# Patient Record
Sex: FEMALE | Race: WHITE | NOT HISPANIC OR LATINO
[De-identification: names, ages, dates, MRNs, and addresses within clinical notes are randomized per-mention and may not be internally consistent; named-entity substitution may affect disease eponyms.]

---

## 2017-08-14 ENCOUNTER — TRANSCRIPTION ENCOUNTER (OUTPATIENT)
Age: 52
End: 2017-08-14

## 2018-03-05 ENCOUNTER — TRANSCRIPTION ENCOUNTER (OUTPATIENT)
Age: 53
End: 2018-03-05

## 2019-03-21 ENCOUNTER — TRANSCRIPTION ENCOUNTER (OUTPATIENT)
Age: 54
End: 2019-03-21

## 2020-11-28 ENCOUNTER — INPATIENT (INPATIENT)
Facility: HOSPITAL | Age: 55
LOS: 1 days | Discharge: HOME | End: 2020-11-30
Attending: NUCLEAR MEDICINE | Admitting: NUCLEAR MEDICINE
Payer: MEDICAID

## 2020-11-28 VITALS
TEMPERATURE: 94 F | SYSTOLIC BLOOD PRESSURE: 190 MMHG | HEIGHT: 61 IN | OXYGEN SATURATION: 100 % | RESPIRATION RATE: 18 BRPM | DIASTOLIC BLOOD PRESSURE: 88 MMHG | HEART RATE: 47 BPM | WEIGHT: 100.09 LBS

## 2020-11-28 LAB
A1C WITH ESTIMATED AVERAGE GLUCOSE RESULT: 5.8 % — HIGH (ref 4–5.6)
ALBUMIN SERPL ELPH-MCNC: 4.3 G/DL — SIGNIFICANT CHANGE UP (ref 3.5–5.2)
ALP SERPL-CCNC: 102 U/L — SIGNIFICANT CHANGE UP (ref 30–115)
ALT FLD-CCNC: 20 U/L — SIGNIFICANT CHANGE UP (ref 0–41)
ANION GAP SERPL CALC-SCNC: 10 MMOL/L — SIGNIFICANT CHANGE UP (ref 7–14)
APTT BLD: 31.2 SEC — SIGNIFICANT CHANGE UP (ref 27–39.2)
AST SERPL-CCNC: 50 U/L — HIGH (ref 0–41)
BASOPHILS # BLD AUTO: 0.04 K/UL — SIGNIFICANT CHANGE UP (ref 0–0.2)
BASOPHILS NFR BLD AUTO: 0.3 % — SIGNIFICANT CHANGE UP (ref 0–1)
BILIRUB SERPL-MCNC: 0.3 MG/DL — SIGNIFICANT CHANGE UP (ref 0.2–1.2)
BLD GP AB SCN SERPL QL: SIGNIFICANT CHANGE UP
BUN SERPL-MCNC: 13 MG/DL — SIGNIFICANT CHANGE UP (ref 10–20)
CALCIUM SERPL-MCNC: 10.3 MG/DL — HIGH (ref 8.5–10.1)
CHLORIDE SERPL-SCNC: 96 MMOL/L — LOW (ref 98–110)
CHOLEST SERPL-MCNC: 219 MG/DL — HIGH
CK MB CFR SERPL CALC: 129.4 NG/ML — HIGH (ref 0.6–6.3)
CK MB CFR SERPL CALC: 217.7 NG/ML — HIGH (ref 0.6–6.3)
CK SERPL-CCNC: 1388 U/L — HIGH (ref 0–225)
CK SERPL-CCNC: 741 U/L — HIGH (ref 0–225)
CO2 SERPL-SCNC: 26 MMOL/L — SIGNIFICANT CHANGE UP (ref 17–32)
CREAT SERPL-MCNC: 0.7 MG/DL — SIGNIFICANT CHANGE UP (ref 0.7–1.5)
EOSINOPHIL # BLD AUTO: 0.01 K/UL — SIGNIFICANT CHANGE UP (ref 0–0.7)
EOSINOPHIL NFR BLD AUTO: 0.1 % — SIGNIFICANT CHANGE UP (ref 0–8)
ESTIMATED AVERAGE GLUCOSE: 120 MG/DL — HIGH (ref 68–114)
GLUCOSE BLDC GLUCOMTR-MCNC: 138 MG/DL — HIGH (ref 70–99)
GLUCOSE SERPL-MCNC: 155 MG/DL — HIGH (ref 70–99)
HCT VFR BLD CALC: 43.1 % — SIGNIFICANT CHANGE UP (ref 37–47)
HCT VFR BLD CALC: 43.2 % — SIGNIFICANT CHANGE UP (ref 37–47)
HCT VFR BLD CALC: 45.5 % — SIGNIFICANT CHANGE UP (ref 37–47)
HDLC SERPL-MCNC: 54 MG/DL — SIGNIFICANT CHANGE UP
HGB BLD-MCNC: 14.7 G/DL — SIGNIFICANT CHANGE UP (ref 12–16)
HGB BLD-MCNC: 14.8 G/DL — SIGNIFICANT CHANGE UP (ref 12–16)
HGB BLD-MCNC: 15.4 G/DL — SIGNIFICANT CHANGE UP (ref 12–16)
IMM GRANULOCYTES NFR BLD AUTO: 0.4 % — HIGH (ref 0.1–0.3)
INR BLD: 0.99 RATIO — SIGNIFICANT CHANGE UP (ref 0.65–1.3)
LACTATE SERPL-SCNC: 1.8 MMOL/L — SIGNIFICANT CHANGE UP (ref 0.7–2)
LIDOCAIN IGE QN: 24 U/L — SIGNIFICANT CHANGE UP (ref 7–60)
LIPID PNL WITH DIRECT LDL SERPL: 150 MG/DL — HIGH
LYMPHOCYTES # BLD AUTO: 1.83 K/UL — SIGNIFICANT CHANGE UP (ref 1.2–3.4)
LYMPHOCYTES # BLD AUTO: 12.7 % — LOW (ref 20.5–51.1)
MCHC RBC-ENTMCNC: 28.5 PG — SIGNIFICANT CHANGE UP (ref 27–31)
MCHC RBC-ENTMCNC: 28.7 PG — SIGNIFICANT CHANGE UP (ref 27–31)
MCHC RBC-ENTMCNC: 29 PG — SIGNIFICANT CHANGE UP (ref 27–31)
MCHC RBC-ENTMCNC: 33.8 G/DL — SIGNIFICANT CHANGE UP (ref 32–37)
MCHC RBC-ENTMCNC: 34.1 G/DL — SIGNIFICANT CHANGE UP (ref 32–37)
MCHC RBC-ENTMCNC: 34.3 G/DL — SIGNIFICANT CHANGE UP (ref 32–37)
MCV RBC AUTO: 83.5 FL — SIGNIFICANT CHANGE UP (ref 81–99)
MCV RBC AUTO: 84.5 FL — SIGNIFICANT CHANGE UP (ref 81–99)
MCV RBC AUTO: 84.7 FL — SIGNIFICANT CHANGE UP (ref 81–99)
MONOCYTES # BLD AUTO: 0.38 K/UL — SIGNIFICANT CHANGE UP (ref 0.1–0.6)
MONOCYTES NFR BLD AUTO: 2.6 % — SIGNIFICANT CHANGE UP (ref 1.7–9.3)
NEUTROPHILS # BLD AUTO: 12.1 K/UL — HIGH (ref 1.4–6.5)
NEUTROPHILS NFR BLD AUTO: 83.9 % — HIGH (ref 42.2–75.2)
NON HDL CHOLESTEROL: 165 MG/DL — HIGH
NRBC # BLD: 0 /100 WBCS — SIGNIFICANT CHANGE UP (ref 0–0)
PLATELET # BLD AUTO: 241 K/UL — SIGNIFICANT CHANGE UP (ref 130–400)
PLATELET # BLD AUTO: 246 K/UL — SIGNIFICANT CHANGE UP (ref 130–400)
PLATELET # BLD AUTO: 255 K/UL — SIGNIFICANT CHANGE UP (ref 130–400)
POTASSIUM SERPL-MCNC: 4.3 MMOL/L — SIGNIFICANT CHANGE UP (ref 3.5–5)
POTASSIUM SERPL-SCNC: 4.3 MMOL/L — SIGNIFICANT CHANGE UP (ref 3.5–5)
PROT SERPL-MCNC: 6.6 G/DL — SIGNIFICANT CHANGE UP (ref 6–8)
PROTHROM AB SERPL-ACNC: 11.4 SEC — SIGNIFICANT CHANGE UP (ref 9.95–12.87)
RAPID RVP RESULT: SIGNIFICANT CHANGE UP
RBC # BLD: 5.11 M/UL — SIGNIFICANT CHANGE UP (ref 4.2–5.4)
RBC # BLD: 5.16 M/UL — SIGNIFICANT CHANGE UP (ref 4.2–5.4)
RBC # BLD: 5.37 M/UL — SIGNIFICANT CHANGE UP (ref 4.2–5.4)
RBC # FLD: 12.7 % — SIGNIFICANT CHANGE UP (ref 11.5–14.5)
RBC # FLD: 12.8 % — SIGNIFICANT CHANGE UP (ref 11.5–14.5)
RBC # FLD: 12.8 % — SIGNIFICANT CHANGE UP (ref 11.5–14.5)
SARS-COV-2 RNA SPEC QL NAA+PROBE: SIGNIFICANT CHANGE UP
SODIUM SERPL-SCNC: 132 MMOL/L — LOW (ref 135–146)
TRIGL SERPL-MCNC: 60 MG/DL — SIGNIFICANT CHANGE UP
TROPONIN T SERPL-MCNC: 0.83 NG/ML — CRITICAL HIGH
TROPONIN T SERPL-MCNC: 2.53 NG/ML — CRITICAL HIGH
WBC # BLD: 12.84 K/UL — HIGH (ref 4.8–10.8)
WBC # BLD: 14.33 K/UL — HIGH (ref 4.8–10.8)
WBC # BLD: 14.42 K/UL — HIGH (ref 4.8–10.8)
WBC # FLD AUTO: 12.84 K/UL — HIGH (ref 4.8–10.8)
WBC # FLD AUTO: 14.33 K/UL — HIGH (ref 4.8–10.8)
WBC # FLD AUTO: 14.42 K/UL — HIGH (ref 4.8–10.8)

## 2020-11-28 PROCEDURE — 71045 X-RAY EXAM CHEST 1 VIEW: CPT | Mod: 26

## 2020-11-28 PROCEDURE — 92941 PRQ TRLML REVSC TOT OCCL AMI: CPT | Mod: LC

## 2020-11-28 PROCEDURE — 93010 ELECTROCARDIOGRAM REPORT: CPT

## 2020-11-28 PROCEDURE — 99291 CRITICAL CARE FIRST HOUR: CPT

## 2020-11-28 PROCEDURE — 93458 L HRT ARTERY/VENTRICLE ANGIO: CPT | Mod: 26,XU

## 2020-11-28 PROCEDURE — 99223 1ST HOSP IP/OBS HIGH 75: CPT

## 2020-11-28 PROCEDURE — 99222 1ST HOSP IP/OBS MODERATE 55: CPT | Mod: 57

## 2020-11-28 RX ORDER — PANTOPRAZOLE SODIUM 20 MG/1
40 TABLET, DELAYED RELEASE ORAL EVERY 12 HOURS
Refills: 0 | Status: DISCONTINUED | OUTPATIENT
Start: 2020-11-28 | End: 2020-11-28

## 2020-11-28 RX ORDER — PANTOPRAZOLE SODIUM 20 MG/1
40 TABLET, DELAYED RELEASE ORAL ONCE
Refills: 0 | Status: COMPLETED | OUTPATIENT
Start: 2020-11-28 | End: 2020-11-28

## 2020-11-28 RX ORDER — MORPHINE SULFATE 50 MG/1
2 CAPSULE, EXTENDED RELEASE ORAL ONCE
Refills: 0 | Status: DISCONTINUED | OUTPATIENT
Start: 2020-11-28 | End: 2020-11-28

## 2020-11-28 RX ORDER — ATORVASTATIN CALCIUM 80 MG/1
80 TABLET, FILM COATED ORAL AT BEDTIME
Refills: 0 | Status: DISCONTINUED | OUTPATIENT
Start: 2020-11-28 | End: 2020-11-30

## 2020-11-28 RX ORDER — SUCRALFATE 1 G
1 TABLET ORAL ONCE
Refills: 0 | Status: COMPLETED | OUTPATIENT
Start: 2020-11-28 | End: 2020-11-28

## 2020-11-28 RX ORDER — LEVOTHYROXINE SODIUM 125 MCG
200 TABLET ORAL DAILY
Refills: 0 | Status: DISCONTINUED | OUTPATIENT
Start: 2020-11-28 | End: 2020-11-30

## 2020-11-28 RX ORDER — CHLORHEXIDINE GLUCONATE 213 G/1000ML
1 SOLUTION TOPICAL DAILY
Refills: 0 | Status: DISCONTINUED | OUTPATIENT
Start: 2020-11-28 | End: 2020-11-30

## 2020-11-28 RX ORDER — PANTOPRAZOLE SODIUM 20 MG/1
80 TABLET, DELAYED RELEASE ORAL ONCE
Refills: 0 | Status: COMPLETED | OUTPATIENT
Start: 2020-11-28 | End: 2020-11-28

## 2020-11-28 RX ORDER — FAMOTIDINE 10 MG/ML
20 INJECTION INTRAVENOUS ONCE
Refills: 0 | Status: COMPLETED | OUTPATIENT
Start: 2020-11-28 | End: 2020-11-28

## 2020-11-28 RX ORDER — ONDANSETRON 8 MG/1
4 TABLET, FILM COATED ORAL ONCE
Refills: 0 | Status: COMPLETED | OUTPATIENT
Start: 2020-11-28 | End: 2020-11-28

## 2020-11-28 RX ORDER — NICOTINE POLACRILEX 2 MG
1 GUM BUCCAL DAILY
Refills: 0 | Status: DISCONTINUED | OUTPATIENT
Start: 2020-11-28 | End: 2020-11-30

## 2020-11-28 RX ORDER — FAMOTIDINE 10 MG/ML
20 INJECTION INTRAVENOUS ONCE
Refills: 0 | Status: DISCONTINUED | OUTPATIENT
Start: 2020-11-28 | End: 2020-11-28

## 2020-11-28 RX ORDER — ONDANSETRON 8 MG/1
4 TABLET, FILM COATED ORAL THREE TIMES A DAY
Refills: 0 | Status: DISCONTINUED | OUTPATIENT
Start: 2020-11-28 | End: 2020-11-30

## 2020-11-28 RX ORDER — CLOPIDOGREL BISULFATE 75 MG/1
75 TABLET, FILM COATED ORAL DAILY
Refills: 0 | Status: DISCONTINUED | OUTPATIENT
Start: 2020-11-28 | End: 2020-11-30

## 2020-11-28 RX ORDER — LISINOPRIL 2.5 MG/1
10 TABLET ORAL DAILY
Refills: 0 | Status: DISCONTINUED | OUTPATIENT
Start: 2020-11-28 | End: 2020-11-30

## 2020-11-28 RX ORDER — SODIUM CHLORIDE 9 MG/ML
1000 INJECTION INTRAMUSCULAR; INTRAVENOUS; SUBCUTANEOUS
Refills: 0 | Status: DISCONTINUED | OUTPATIENT
Start: 2020-11-28 | End: 2020-11-30

## 2020-11-28 RX ORDER — MORPHINE SULFATE 50 MG/1
1 CAPSULE, EXTENDED RELEASE ORAL THREE TIMES A DAY
Refills: 0 | Status: DISCONTINUED | OUTPATIENT
Start: 2020-11-28 | End: 2020-11-30

## 2020-11-28 RX ORDER — SODIUM CHLORIDE 9 MG/ML
1000 INJECTION, SOLUTION INTRAVENOUS ONCE
Refills: 0 | Status: COMPLETED | OUTPATIENT
Start: 2020-11-28 | End: 2020-11-28

## 2020-11-28 RX ORDER — ASPIRIN/CALCIUM CARB/MAGNESIUM 324 MG
325 TABLET ORAL ONCE
Refills: 0 | Status: COMPLETED | OUTPATIENT
Start: 2020-11-28 | End: 2020-11-28

## 2020-11-28 RX ORDER — SODIUM CHLORIDE 9 MG/ML
1000 INJECTION INTRAMUSCULAR; INTRAVENOUS; SUBCUTANEOUS
Refills: 0 | Status: DISCONTINUED | OUTPATIENT
Start: 2020-11-28 | End: 2020-11-29

## 2020-11-28 RX ORDER — ASPIRIN/CALCIUM CARB/MAGNESIUM 324 MG
81 TABLET ORAL DAILY
Refills: 0 | Status: DISCONTINUED | OUTPATIENT
Start: 2020-11-28 | End: 2020-11-30

## 2020-11-28 RX ORDER — MORPHINE SULFATE 50 MG/1
1 CAPSULE, EXTENDED RELEASE ORAL ONCE
Refills: 0 | Status: DISCONTINUED | OUTPATIENT
Start: 2020-11-28 | End: 2020-11-28

## 2020-11-28 RX ORDER — PANTOPRAZOLE SODIUM 20 MG/1
8 TABLET, DELAYED RELEASE ORAL
Qty: 80 | Refills: 0 | Status: DISCONTINUED | OUTPATIENT
Start: 2020-11-28 | End: 2020-11-29

## 2020-11-28 RX ADMIN — FAMOTIDINE 20 MILLIGRAM(S): 10 INJECTION INTRAVENOUS at 12:05

## 2020-11-28 RX ADMIN — Medication 81 MILLIGRAM(S): at 16:07

## 2020-11-28 RX ADMIN — PANTOPRAZOLE SODIUM 10 MG/HR: 20 TABLET, DELAYED RELEASE ORAL at 13:58

## 2020-11-28 RX ADMIN — Medication 1 GRAM(S): at 13:34

## 2020-11-28 RX ADMIN — CLOPIDOGREL BISULFATE 75 MILLIGRAM(S): 75 TABLET, FILM COATED ORAL at 16:07

## 2020-11-28 RX ADMIN — PANTOPRAZOLE SODIUM 40 MILLIGRAM(S): 20 TABLET, DELAYED RELEASE ORAL at 12:05

## 2020-11-28 RX ADMIN — ONDANSETRON 4 MILLIGRAM(S): 8 TABLET, FILM COATED ORAL at 09:35

## 2020-11-28 RX ADMIN — LISINOPRIL 10 MILLIGRAM(S): 2.5 TABLET ORAL at 16:07

## 2020-11-28 RX ADMIN — Medication 325 MILLIGRAM(S): at 05:44

## 2020-11-28 RX ADMIN — ONDANSETRON 4 MILLIGRAM(S): 8 TABLET, FILM COATED ORAL at 05:46

## 2020-11-28 RX ADMIN — MORPHINE SULFATE 1 MILLIGRAM(S): 50 CAPSULE, EXTENDED RELEASE ORAL at 14:45

## 2020-11-28 RX ADMIN — MORPHINE SULFATE 1 MILLIGRAM(S): 50 CAPSULE, EXTENDED RELEASE ORAL at 06:45

## 2020-11-28 RX ADMIN — SODIUM CHLORIDE 75 MILLILITER(S): 9 INJECTION INTRAMUSCULAR; INTRAVENOUS; SUBCUTANEOUS at 09:05

## 2020-11-28 RX ADMIN — ATORVASTATIN CALCIUM 80 MILLIGRAM(S): 80 TABLET, FILM COATED ORAL at 21:14

## 2020-11-28 RX ADMIN — PANTOPRAZOLE SODIUM 10 MG/HR: 20 TABLET, DELAYED RELEASE ORAL at 22:37

## 2020-11-28 RX ADMIN — SODIUM CHLORIDE 1000 MILLILITER(S): 9 INJECTION, SOLUTION INTRAVENOUS at 05:43

## 2020-11-28 RX ADMIN — PANTOPRAZOLE SODIUM 80 MILLIGRAM(S): 20 TABLET, DELAYED RELEASE ORAL at 14:34

## 2020-11-28 RX ADMIN — MORPHINE SULFATE 2 MILLIGRAM(S): 50 CAPSULE, EXTENDED RELEASE ORAL at 06:01

## 2020-11-28 NOTE — H&P ADULT - HISTORY OF PRESENT ILLNESS
55 female with hypothyroidism presented for chest pain since 9 pm associated with 2 episodes of non-bloody, non-bilious vomiting. She initially presented tot [facility]. Her ECG at the [facility] ER did not meet criteria for STEMI, but had evidence of hyperacute T-waves, and she was transferred to [place] ER. The patient had persistent severe chest pain and the decision was made to proceed with urgent cath/PCI. The patient is S/p cath, currently hemodynamically stable. She had an episode of nausea and vomiting with mild red tinged vomitus. She denies any chest pain, but does say she has really bad gastritis and she has been taking Pepto-Bismol and omeprazole outpatient.

## 2020-11-28 NOTE — ED PROVIDER NOTE - NS ED ROS FT
Review of Systems:  CONSTITUTIONAL: No fever, No diaphoresis, No weight change  SKIN: No rash  HEMATOLOGIC: No abnormal bleeding or bruising  EYES: No eye pain, No blurred vision  ENT:  No sore throat, No neck pain, No rhinorrhea  RESPIRATORY: No shortness of breath, No cough  CARDIAC: +chest pain, No palpitations  GI: No abdominal pain, +nausea, +vomiting, No diarrhea, No constipation, No bright red blood per rectum or melena. No flank pain  : No dysuria, frequency, hematuria.   MUSCULOSKELETAL: No joint paint, No swelling, No back pain  NEUROLOGIC: No numbness, No focal weakness, No headache, No dizziness  All other systems negative, unless specified in HPI

## 2020-11-28 NOTE — CONSULT NOTE ADULT - ATTENDING COMMENTS
Patient seen and examined. D/w the cardiology fellow.  ECG not meeting the criteria for STEMI, however given persistent severe chest pain and elevated troponins, patient was taken to cath lab for urgent cath. She was found to have 100% thrombotic occlusion of the LCX and PCI was performed. LV EF was moderately depressed.  EF 35% by LVG  Severe HTN  Bradycardic.    Patient loaded with ASA and Plavix 600 mg (Brilinta not used due to bradycardia)  Hydralazine was given for the BP control.    Plan:  Admit to CCU post PCI  No BB due to bradycardia  Start statin (high intensity), ACE-I, c/w DAPT  F/u labs.  Check thyroid panel, lipids, HgA1c, repeat CBC, CE  2d echo tomorrow.

## 2020-11-28 NOTE — H&P ADULT - NSHPLABSRESULTS_GEN_ALL_CORE
15.4   14.42 )-----------( 255      ( 28 Nov 2020 05:40 )             45.5   11-28    132<L>  |  96<L>  |  13  ----------------------------<  155<H>  4.3   |  26  |  0.7    Ca    10.3<H>      28 Nov 2020 05:40    TPro  6.6  /  Alb  4.3  /  TBili  0.3  /  DBili  x   /  AST  50<H>  /  ALT  20  /  AlkPhos  102  11-28

## 2020-11-28 NOTE — ED PROVIDER NOTE - OBJECTIVE STATEMENT
56 y/o F PMHx hypothyroidism and gastritis presents to ED with severe crushing bilateral nonradiating chest pain since 9pm tonight that has been worsening. Pt reported 3 episodes of nbnb vomiting at home prompting visit. 56 y/o F PMHx hypothyroidism and gastritis presents to ED with severe crushing bilateral nonradiating chest pain since 9pm tonight that has been worsening. Pt reported 3 episodes of nbnb vomiting at home prompting visit. Denies SOB. Pt never seen by cardiologist in past, no hx of stress or echo. No reported fevers or cough.

## 2020-11-28 NOTE — H&P ADULT - ASSESSMENT
ACS- S/p Cath and stenting in Left Circum flex.   PLAN:    CNS: avoid sedatives    HEENT: Oral care    PULMONARY:  HOB @ 45 degrees    CARDIOVASCULAR:  Continue CCU monitoring  No BB due to bradycardia  Start statin (high intensity), ACE-I, c/w DAPT  Check thyroid panel, lipids, HgA1c, repeat CBC, CE  2d echo    GI: Follow up with GI. Protonix IV BID. Feeds advance as tolerated.     RENAL:  Follow up renal function and lytes.  Correct as needed    INFECTIOUS DISEASE: Monitor VS    HEMATOLOGICAL:  DVT prophylaxis    ENDOCRINE:  Follow up FS.  Insulin protocol if needed.

## 2020-11-28 NOTE — ED PROVIDER NOTE - PROGRESS NOTE DETAILS
DL: Code STEMI called. Cardiology aware. COVID sent. PODLOG: STEMI on EKG. Pads placed on pt. IV's obtain. Atropine bedside. Discussed with cardiology, will transfer north. Dr. Davis accepting. Ndubuisi: Sign out received from [facility] ED. pt transferred for STEMI. Cardiology at bedside

## 2020-11-28 NOTE — CONSULT NOTE ADULT - SUBJECTIVE AND OBJECTIVE BOX
Date of Admission:    CHIEF COMPLAINT:    HISTORY OF PRESENT ILLNESS:     PAST MEDICAL & SURGICAL HISTORY:  Hypothyroid    GERD (gastroesophageal reflux disease)    Anemia        FAMILY HISTORY:  [ ] no pertinent family history of premature cardiovascular disease in first degree relatives.  Mother:   Father:   Siblings:     SOCIAL HISTORY:    [ ] Non-smoker  [ ] Smoker  [ ] Alcohol    Allergies    No Known Allergies    Intolerances    	    REVIEW OF SYSTEMS:  CONSTITUTIONAL: denies fever, weight loss, or fatigue  CARDIOLOGY: denies chest pain, shortness of breath or syncopal episodes.   RESPIRATORY: denies shortness of breath, wheezing.   NEUROLOGICAL: denies weakness, no focal deficits to report.  ENDOCRINOLOGICAL: no recent change in diabetic medications.   GI: no BRBPR, no N,V, diarrhea.    PSYCHIATRY: normal mood and affect  HEENT: no nasal discharge, no ecchymosis  SKIN: no ecchymosis, no breakdown  MUSCULOSKELETAL: Full range of motion x4.     PHYSICAL EXAM:  T(C): 34.6 (11-28-20 @ 05:46), Max: 34.6 (11-28-20 @ 05:46)  HR: 47 (11-28-20 @ 06:49) (47 - 49)  BP: 187/111 (11-28-20 @ 06:49) (187/87 - 190/88)  RR: 16 (11-28-20 @ 06:49) (16 - 18)  SpO2: 99% (11-28-20 @ 06:49) (99% - 100%)  Wt(kg): --  I&O's Summary      General Appearance: well appearing, normal for age and gender. 	  Neck: normal JVP, no bruit.   Eyes: No xanthomalasia, Extra Ocular muscles intact.   Cardiovascular: regular rate and rhythm S1 S2, No JVD, No murmurs, No edema  Respiratory: Lungs clear to auscultation	  Psychiatry: Alert and oriented x 3, Mood & affect appropriate  Gastrointestinal:  Soft, Non-tender  Skin/Integumen: No rashes, No ecchymoses, No cyanosis	  Neurologic: Non-focal  Musculoskeletal/extremities: Normal range of motion, No clubbing, cyanosis or edema  Vascular: Peripheral pulses palpable 2+ bilaterally    LABS:	 	                          15.4   14.42 )-----------( 255      ( 28 Nov 2020 05:40 )             45.5     11-28    132<L>  |  96<L>  |  13  ----------------------------<  155<H>  4.3   |  26  |  0.7    Ca    10.3<H>      28 Nov 2020 05:40    TPro  6.6  /  Alb  4.3  /  TBili  0.3  /  DBili  x   /  AST  50<H>  /  ALT  20  /  AlkPhos  102  11-28    CARDIAC MARKERS ( 28 Nov 2020 05:40 )  x     / 0.83 ng/mL / 741 U/L / x     / 129.4 ng/mL          TELEMETRY EVENTS: 	Sinus bradycardia     ECG:  	Sinus bradycardia, inferior ST changes, peak TWI in anterior leads   RADIOLOGY:  OTHER: 	    PREVIOUS DIAGNOSTIC TESTING:    [ ] Echocardiogram:  [ ] Catheterization:  [ ] Stress Test:  	  	    Home Medications:    MEDICATIONS  (STANDING):    MEDICATIONS  (PRN):         Date of Admission: 11/28/2020    CHIEF COMPLAINT: chest pain     HISTORY OF PRESENT ILLNESS:   55 female with hypothyroidism and HTN presenting for chest pain since 9 pm associated with 2 episodes of non-bloody. non-bilious vomiting.     PAST MEDICAL & SURGICAL HISTORY:  Hypothyroid    GERD (gastroesophageal reflux disease)    Anemia        FAMILY HISTORY:  [x ] no pertinent family history of premature cardiovascular disease in first degree relatives.  Mother:   Father:   Siblings:     SOCIAL HISTORY:    [ ] Non-smoker  [x ] Smoker  [ ] Alcohol    Allergies    No Known Allergies    Intolerances    	    REVIEW OF SYSTEMS:  CONSTITUTIONAL: denies fever, weight loss, or fatigue  CARDIOLOGY: see HPI   RESPIRATORY: denies shortness of breath, wheezing.   NEUROLOGICAL: denies weakness, no focal deficits to report.  ENDOCRINOLOGICAL: no recent change in diabetic medications.   GI: no BRBPR, no N,V, diarrhea.    PSYCHIATRY: normal mood and affect  HEENT: no nasal discharge, no ecchymosis  SKIN: no ecchymosis, no breakdown  MUSCULOSKELETAL: Full range of motion x4.     PHYSICAL EXAM:  T(C): 34.6 (11-28-20 @ 05:46), Max: 34.6 (11-28-20 @ 05:46)  HR: 47 (11-28-20 @ 06:49) (47 - 49)  BP: 187/111 (11-28-20 @ 06:49) (187/87 - 190/88)  RR: 16 (11-28-20 @ 06:49) (16 - 18)  SpO2: 99% (11-28-20 @ 06:49) (99% - 100%)  Wt(kg): --  I&O's Summary      General Appearance: well appearing, normal for age and gender. 	  Neck: normal JVP, no bruit.   Eyes: No xanthomalasia, Extra Ocular muscles intact.   Cardiovascular: bradycardia, S1 S2, No JVD, No murmurs, No edema  Respiratory: Lungs clear to auscultation	  Psychiatry: Alert and oriented x 3, Mood & affect appropriate  Gastrointestinal:  Soft, Non-tender  Skin/Integumen: No rashes, No ecchymoses, No cyanosis	  Neurologic: Non-focal  Musculoskeletal/extremities: Normal range of motion, No clubbing, cyanosis or edema  Vascular: Peripheral pulses palpable 2+ bilaterally    LABS:	 	                          15.4   14.42 )-----------( 255      ( 28 Nov 2020 05:40 )             45.5     11-28    132<L>  |  96<L>  |  13  ----------------------------<  155<H>  4.3   |  26  |  0.7    Ca    10.3<H>      28 Nov 2020 05:40    TPro  6.6  /  Alb  4.3  /  TBili  0.3  /  DBili  x   /  AST  50<H>  /  ALT  20  /  AlkPhos  102  11-28    CARDIAC MARKERS ( 28 Nov 2020 05:40 )  x     / 0.83 ng/mL / 741 U/L / x     / 129.4 ng/mL          TELEMETRY EVENTS: 	Sinus bradycardia     ECG:  	Sinus bradycardia, inferior ST changes, peak TWI in anterior leads   RADIOLOGY:  OTHER: 	    PREVIOUS DIAGNOSTIC TESTING:    [ ] Echocardiogram:  [ ] Catheterization:  [ ] Stress Test:  	  	    Home Medications:    MEDICATIONS  (STANDING):    MEDICATIONS  (PRN):         Date of Admission: 11/28/2020    CHIEF COMPLAINT: chest pain     HISTORY OF PRESENT ILLNESS:   55 female with hypothyroidism and HTN presenting for chest pain since 9 pm associated with 2 episodes of non-bloody. non-bilious vomiting. Patient's ECG at the [facility] ER did not meet criteria for STEMI, but had evidence of hyperacute T-waves, and she was transferred to [place] ER. Her ECG still did not quite meet the criteria for STEMI, however the patient had persistent severe chest pain and the decision was made to proceed with urgent cath/PCI.     PAST MEDICAL & SURGICAL HISTORY:  Hypothyroid    GERD (gastroesophageal reflux disease)    Anemia        FAMILY HISTORY:  [x ] no pertinent family history of premature cardiovascular disease in first degree relatives.  Mother:   Father:   Siblings:     SOCIAL HISTORY:    [ ] Non-smoker  [x ] Smoker  [ ] Alcohol    Allergies    No Known Allergies    Intolerances    	    REVIEW OF SYSTEMS:  CONSTITUTIONAL: denies fever, weight loss, or fatigue  CARDIOLOGY: see HPI   RESPIRATORY: denies shortness of breath, wheezing.   NEUROLOGICAL: denies weakness, no focal deficits to report.  ENDOCRINOLOGICAL: no recent change in diabetic medications.   GI: no BRBPR, no N,V, diarrhea.    PSYCHIATRY: normal mood and affect  HEENT: no nasal discharge, no ecchymosis  SKIN: no ecchymosis, no breakdown  MUSCULOSKELETAL: Full range of motion x4.     PHYSICAL EXAM:  T(C): 34.6 (11-28-20 @ 05:46), Max: 34.6 (11-28-20 @ 05:46)  HR: 47 (11-28-20 @ 06:49) (47 - 49)  BP: 187/111 (11-28-20 @ 06:49) (187/87 - 190/88)  RR: 16 (11-28-20 @ 06:49) (16 - 18)  SpO2: 99% (11-28-20 @ 06:49) (99% - 100%)  Wt(kg): --  I&O's Summary      General Appearance: well appearing, normal for age and gender. 	  Neck: normal JVP, no bruit.   Eyes: No xanthomalasia, Extra Ocular muscles intact.   Cardiovascular: bradycardia, S1 S2, No JVD, No murmurs, No edema  Respiratory: Lungs clear to auscultation	  Psychiatry: Alert and oriented x 3, Mood & affect appropriate  Gastrointestinal:  Soft, Non-tender  Skin/Integumen: No rashes, No ecchymoses, No cyanosis	  Neurologic: Non-focal  Musculoskeletal/extremities: Normal range of motion, No clubbing, cyanosis or edema  Vascular: Peripheral pulses palpable 2+ bilaterally    LABS:	 	                          15.4   14.42 )-----------( 255      ( 28 Nov 2020 05:40 )             45.5     11-28    132<L>  |  96<L>  |  13  ----------------------------<  155<H>  4.3   |  26  |  0.7    Ca    10.3<H>      28 Nov 2020 05:40    TPro  6.6  /  Alb  4.3  /  TBili  0.3  /  DBili  x   /  AST  50<H>  /  ALT  20  /  AlkPhos  102  11-28    CARDIAC MARKERS ( 28 Nov 2020 05:40 )  x     / 0.83 ng/mL / 741 U/L / x     / 129.4 ng/mL          TELEMETRY EVENTS: 	Sinus bradycardia     ECG:  	Sinus bradycardia, inferior ST changes, peak TWI in anterior leads   RADIOLOGY:  OTHER: 	    PREVIOUS DIAGNOSTIC TESTING:    [ ] Echocardiogram:  [ ] Catheterization:  [ ] Stress Test:  	  	    Home Medications:    MEDICATIONS  (STANDING):    MEDICATIONS  (PRN):

## 2020-11-28 NOTE — CONSULT NOTE ADULT - ASSESSMENT
55 female with hypothyroidism presented for chest pain since 9 pm associated with 2 episodes of non-bloody, non-bilious vomiting. STEMI code, s/p urgent cath/PCI.  She was found to have 100% thrombotic occlusion of the LCX and PCI was performed. LV EF was moderately depressed (EF 35% by LVG). currently on dual antiplatelets ASA and plavix      *GI called for nausea and vomiting with mild red tinged vomitus (150mL per the nurse)  -HD stable  -on dual antiplatelets for acute STEMI  -PPI drip  -2 large bore IV  -Trend CBC, stat one is pending  -keep active type and screen  -NPO  -check PT/PTT  -low threshold for endoscopy  -discussed with cardiology   -will follow      55 female with hypothyroidism presented for chest pain since 9 pm associated with 2 episodes of non-bloody, non-bilious vomiting. STEMI code, s/p urgent cath/PCI.  She was found to have 100% thrombotic occlusion of the LCX and PCI was performed. LV EF was moderately depressed (EF 35% by LVG). currently on dual antiplatelets ASA and plavix      *GI called for nausea and vomiting with mild red tinged vomitus (150mL per the nurse), no evidence of active GI Bleeding  -HD stable  -on dual antiplatelets for acute STEMI  -PPI drip  -2 large bore IV  -Trend CBC, stat one is pending  -keep active type and screen  -NPO  -check PT/PTT  -low threshold for endoscopy  -discussed with cardiology   -will follow

## 2020-11-28 NOTE — H&P ADULT - NSHPPHYSICALEXAM_GEN_ALL_CORE
GENERAL: NAD, AOX3, Thin Fragile  HEENT: Atraumatic normocephalic   CHEST: clear to auscultate bilaterally   HEART: Normal S1 and S2  ABDOMEN: Soft mildly tender in epigastrium   EXTREMITIES: No edema

## 2020-11-28 NOTE — ED PROVIDER NOTE - PHYSICAL EXAMINATION
CONSTITUTIONAL: clutching chest, uncomfortable in stretcher   SKIN: warm, dry  HEAD: Normocephalic; atraumatic.  EYES: no conjunctival injection. PERRLA. EOMI.   ENT: No nasal discharge; airway clear.  NECK: Supple; non tender.  CARD: S1, S2 normal; no murmurs, gallops, or rubs. +bradycardic   RESP: No wheezes, rales or rhonchi.  ABD: soft ntnd.   EXT: Normal ROM.  No LE edema.   LYMPH: No acute cervical adenopathy.  NEURO: Alert, oriented, grossly unremarkable.  PSYCH: Cooperative, appropriate.

## 2020-11-28 NOTE — ED PROVIDER NOTE - ATTENDING CONTRIBUTION TO CARE
I personally evaluated the patient. I reviewed the Resident’s or Physician Assistant’s note (as assigned above), and agree with the findings and plan except as documented in my note.    Pt is a 54 y/o female with hx of hypothyroidism, gastritis presents to ED for chest pain that started last night, moderate, constant, worse since onset. + diffuse abd pain. No n/v/d, cough, fever, diaphoresis, syncope. Pt states abd pain feels like gastritis but chest pain is new.    Constitutional: Well developed, well nourished. NAD.  Head: Normocephalic, atraumatic.  Eyes: PERRL. EOMI.  ENT: No nasal discharge. Mucous membranes moist.  Neck: Supple. Painless ROM.  Cardiovascular: Normal S1, S2. Regular rate and rhythm. No murmurs, rubs, or gallops.  Pulmonary: Normal respiratory rate and effort. Lungs clear to auscultation bilaterally. No wheezing, rales, or rhonchi.  Abdominal: Soft. Nondistended. Nontender. No rebound, guarding, rigidity.  Extremities. Pelvis stable. No lower extremity edema, symmetric calves.  Skin: No rashes, cyanosis.  Neuro: AAOx3. No focal neurological deficits.  Psych: Normal mood. Normal affect.

## 2020-11-28 NOTE — CONSULT NOTE ADULT - SUBJECTIVE AND OBJECTIVE BOX
Gastroenterology Consultation:    Patient is a 55y old  Female who presents with a chief complaint of chest pain (28 Nov 2020 06:39)      Admitted on: 11-28-20  HPI:  55 female with hypothyroidism presented for chest pain since 9 pm associated with 2 episodes of non-bloody, non-bilious vomiting. Her ECG at the [facility] ER did not meet criteria for STEMI, but had evidence of hyperacute T-waves, and she was transferred to [facility]. The patient had persistent severe chest pain and the decision was made to proceed with urgent cath/PCI. She was found to have 100% thrombotic occlusion of the LCX and PCI was performed. LV EF was moderately depressed (EF 35% by LVG)     GI called for nausea and vomiting with mild red tinged vomitus (150mL per the nurse). currently hemodynamically stable.   she reports having gastritis for years, never had endoscopy, her pain is epigastric burning sometimes sharp, happens once per month, worsened with tomato paste and spicy food, relieved by Pepcid and Pepto-Bismol. associated with nausea, vomiting she has been recently loosing weight but also dental problems and is working on them. no melena but does have intermittent maroon colored stool.     Prior records Reviewed (Y/N): Y  History obtained from person other than patient (Y/N):N    Prior EGD: never   Prior Colonoscopy: never       PAST MEDICAL & SURGICAL HISTORY:  Hypothyroid    GERD (gastroesophageal reflux disease)    Anemia        FAMILY HISTORY: sister (lung cancer), uncle (gastric cancer) father (colon cancer at 70) brother (prostate cancer)      Social History:  Tobacco: active 1 pack per day for 40 years   Alcohol: N  Drugs: Marijuana occasionally     Home Medications:  levothyroxine 200 mcg (0.2 mg) oral capsule: 1 cap(s) orally once a day (28 Nov 2020 12:36)  Lipitor 20 mg oral tablet: 1 tab(s) orally once a day (28 Nov 2020 12:37)    MEDICATIONS  (STANDING):  aspirin  chewable 81 milliGRAM(s) Oral daily  atorvastatin 80 milliGRAM(s) Oral at bedtime  clopidogrel Tablet 75 milliGRAM(s) Oral daily  levothyroxine 200 MICROGram(s) Oral daily  lisinopril 10 milliGRAM(s) Oral daily  pantoprazole Infusion 8 mG/Hr (10 mL/Hr) IV Continuous <Continuous>  sodium chloride 0.9%. 1000 milliLiter(s) (75 mL/Hr) IV Continuous <Continuous>    MEDICATIONS  (PRN):  ondansetron Injectable 4 milliGRAM(s) IV Push three times a day PRN Nausea and/or Vomiting      Allergies  No Known Allergies      Review of Systems:   Constitutional:  No Fever, No Chills, + weight loss  ENT/Mouth: no upper denture   Eyes:  No Eye Pain, No Vision Changes  Cardiovascular:  Chest Pain resolved, + bradycardia  Respiratory:  No Cough, No Dyspnea  Gastrointestinal:  As described in HPI  Musculoskeletal:  No Joint Swelling, No Back Pain  Skin:  No Skin Lesions, No Jaundice  Neuro:  No Syncope, No Dizziness  Heme/Lymph:  No Bruising, No Bleeding.       Physical Examination:  T(C): 36.2 (11-28-20 @ 12:00), Max: 36.2 (11-28-20 @ 12:00)  HR: 62 (11-28-20 @ 12:00) (47 - 62)  BP: 139/82 (11-28-20 @ 12:00) (139/82 - 190/88)  RR: 33 (11-28-20 @ 12:00) (16 - 33)  SpO2: 100% (11-28-20 @ 12:00) (99% - 100%)  Height (cm): 157.5 (11-28-20 @ 08:34)  Weight (kg): 48 (11-28-20 @ 08:34)    11-28-20 @ 07:01  -  11-28-20 @ 12:51  --------------------------------------------------------  IN: 250 mL / OUT: 300 mL / NET: -50 mL        Constitutional: No acute distress.  Eyes:. Conjunctivae are clear, Sclera is non-icteric.  Ears Nose and Throat: The external ears are normal appearing,  Oral mucosa is pink and moist.  Respiratory:  No signs of respiratory distress. Lung sounds are clear bilaterally.  Cardiovascular:  S1 S2, Regular rate and rhythm.  GI: Abdomen is soft, symmetric, and non-tender without distention. There are no visible lesions or scars. Bowel sounds are present and normoactive in all four quadrants. MILTON brown stool    Neuro: No Tremor, No involuntary movements  Skin: No rashes, No Jaundice.          Data: (reviewed by attending)                        15.4   14.42 )-----------( 255      ( 28 Nov 2020 05:40 )             45.5     Hgb Trend:  15.4  11-28-20 @ 05:40        11-28    132<L>  |  96<L>  |  13  ----------------------------<  155<H>  4.3   |  26  |  0.7    Ca    10.3<H>      28 Nov 2020 05:40    TPro  6.6  /  Alb  4.3  /  TBili  0.3  /  DBili  x   /  AST  50<H>  /  ALT  20  /  AlkPhos  102  11-28    Liver panel trend:  TBili 0.3   /   AST 50   /   ALT 20   /   AlkP 102   /   Tptn 6.6   /   Alb 4.3    /   DBili --      11-28         Radiology:(reviewed by attending)    < from: Xray Chest 1 View-PORTABLE IMMEDIATE (Xray Chest 1 View-PORTABLE IMMEDIATE .) (11.28.20 @ 06:20) >  EXAM:  XR CHEST PORTABLE IMMED 1V            PROCEDURE DATE:  11/28/2020            INTERPRETATION:  Clinical History / Reason for exam: Pain    Comparison : Chest radiograph June 25, 2016.    Technique/Positioning: Adequate.    Findings:    Support devices: None.    Cardiac/mediastinum/hilum: Unremarkable.    Lung parenchyma/Pleura: Within normal limits.    Skeleton/soft tissues: Unremarkable.    Impression:    No radiographic evidence of acute cardiopulmonary disease.    < end of copied text >

## 2020-11-28 NOTE — ED ADULT NURSE REASSESSMENT NOTE - NS ED NURSE REASSESS COMMENT FT1
Report given to Charge RN, April at [facility]. [facility] arrival at this time as well for transport to [facility].
pt received from [facility], placed on Zoll pads and continuous hemodynamic monitoring. 12 lead ECG repeated, cardio at bedside. pt to be taken to Cath Lab.

## 2020-11-28 NOTE — CHART NOTE - NSCHARTNOTEFT_GEN_A_CORE
PRE-OP DIAGNOSIS: ACS    PROCEDURE:[ x ] LHC                         [  ] Coronary angiography                         [  ] [facility]  Physician: Dr Washington  Assistant: Dr Robles    ANESTHESIA TYPE:  [  ]General Anesthesia  [ x ] Sedation  [ x ] Local/Regional    ESTIMATED BLOOD LOSS:    10   mL    CONDITION  [  ] Critical  [  ] Serious  [ x ]Fair  [  ]Good    IV CONTRAST:       200      mL    FINDINGS  Left Heart Catheterization:  LVEF%: 30   LVEDP: elevated      ACCESS:    [x ] right radial artery  [ ] right femoral artery    LEFT HEART CATHETERIZATION                                    Left main: luminal irregularities  LAD:  mild disease   Diag: mild disease   Left Circumflex: co-dominant proximal 40 % stenosis, 99% distal segment occlusion with thrombus  OM: mild disease  Right Coronary Artery: mild to moderate disease  RPDA: mild disease      INTERVENTION  IMPLANTS:  2.5 X 20 Synergy RX drug-eluting distal LCx    SPECIMEN REMOVED: none      POST-OP DIAGNOSIS  Significant single vessel disease (LCx)       PLAN OF CARE  -Admit for observation in CCU  -Continue ASA/plavix, lipitor 80 mg daily, lisinopril 10 mg daily  -f/u echo, HbA1C and lipid panel PRE-OP DIAGNOSIS: ACS    PROCEDURE:[ x ] LHC                         [  ] Coronary angiography                         [  ] [facility]  Physician: Dr Washington  Assistant: Dr Robles    ANESTHESIA TYPE:  [  ]General Anesthesia  [ x ] Sedation  [ x ] Local/Regional    ESTIMATED BLOOD LOSS:    10   mL    CONDITION  [  ] Critical  [  ] Serious  [ x ]Fair  [  ]Good    IV CONTRAST:       200      mL    FINDINGS  Left Heart Catheterization:  LVEF%: 30   LVEDP: elevated      ACCESS:    [x ] right radial artery  [ ] right femoral artery    LEFT HEART CATHETERIZATION                                    Left main: luminal irregularities  LAD:  mild disease mid 20%  Diag: mild disease   Left Circumflex: co-dominant proximal 30-40 % stenosis, 100% distal segment occlusion with thrombus  OM: mild disease  Right Coronary Artery: mild to moderate disease  RPDA: mild disease, RPLV - ostial 60%      INTERVENTION  IMPLANTS:  2.5 X 20 Synergy RX drug-eluting distal LCx    SPECIMEN REMOVED: none      POST-OP DIAGNOSIS  Significant single vessel disease (LCx)       PLAN OF CARE  -Admit for observation in CCU  -Continue ASA/plavix, lipitor 80 mg daily, lisinopril 10 mg daily  -f/u echo, HbA1C and lipid panel

## 2020-11-29 LAB
ALBUMIN SERPL ELPH-MCNC: 3.5 G/DL — SIGNIFICANT CHANGE UP (ref 3.5–5.2)
ALP SERPL-CCNC: 80 U/L — SIGNIFICANT CHANGE UP (ref 30–115)
ALT FLD-CCNC: 24 U/L — SIGNIFICANT CHANGE UP (ref 0–41)
ANION GAP SERPL CALC-SCNC: 14 MMOL/L — SIGNIFICANT CHANGE UP (ref 7–14)
AST SERPL-CCNC: 65 U/L — HIGH (ref 0–41)
BASOPHILS # BLD AUTO: 0.03 K/UL — SIGNIFICANT CHANGE UP (ref 0–0.2)
BASOPHILS NFR BLD AUTO: 0.3 % — SIGNIFICANT CHANGE UP (ref 0–1)
BILIRUB SERPL-MCNC: 0.4 MG/DL — SIGNIFICANT CHANGE UP (ref 0.2–1.2)
BUN SERPL-MCNC: 10 MG/DL — SIGNIFICANT CHANGE UP (ref 10–20)
CALCIUM SERPL-MCNC: 8.7 MG/DL — SIGNIFICANT CHANGE UP (ref 8.5–10.1)
CHLORIDE SERPL-SCNC: 104 MMOL/L — SIGNIFICANT CHANGE UP (ref 98–110)
CK MB CFR SERPL CALC: 50 NG/ML — HIGH (ref 0.6–6.3)
CK SERPL-CCNC: 683 U/L — HIGH (ref 0–225)
CO2 SERPL-SCNC: 21 MMOL/L — SIGNIFICANT CHANGE UP (ref 17–32)
CREAT SERPL-MCNC: 0.6 MG/DL — LOW (ref 0.7–1.5)
EOSINOPHIL # BLD AUTO: 0.02 K/UL — SIGNIFICANT CHANGE UP (ref 0–0.7)
EOSINOPHIL NFR BLD AUTO: 0.2 % — SIGNIFICANT CHANGE UP (ref 0–8)
GLUCOSE SERPL-MCNC: 93 MG/DL — SIGNIFICANT CHANGE UP (ref 70–99)
HCT VFR BLD CALC: 39.9 % — SIGNIFICANT CHANGE UP (ref 37–47)
HCT VFR BLD CALC: 40 % — SIGNIFICANT CHANGE UP (ref 37–47)
HGB BLD-MCNC: 13.1 G/DL — SIGNIFICANT CHANGE UP (ref 12–16)
HGB BLD-MCNC: 13.5 G/DL — SIGNIFICANT CHANGE UP (ref 12–16)
IMM GRANULOCYTES NFR BLD AUTO: 0.4 % — HIGH (ref 0.1–0.3)
LYMPHOCYTES # BLD AUTO: 2.63 K/UL — SIGNIFICANT CHANGE UP (ref 1.2–3.4)
LYMPHOCYTES # BLD AUTO: 24.9 % — SIGNIFICANT CHANGE UP (ref 20.5–51.1)
MAGNESIUM SERPL-MCNC: 1.8 MG/DL — SIGNIFICANT CHANGE UP (ref 1.8–2.4)
MCHC RBC-ENTMCNC: 28.3 PG — SIGNIFICANT CHANGE UP (ref 27–31)
MCHC RBC-ENTMCNC: 28.5 PG — SIGNIFICANT CHANGE UP (ref 27–31)
MCHC RBC-ENTMCNC: 32.8 G/DL — SIGNIFICANT CHANGE UP (ref 32–37)
MCHC RBC-ENTMCNC: 33.8 G/DL — SIGNIFICANT CHANGE UP (ref 32–37)
MCV RBC AUTO: 84.4 FL — SIGNIFICANT CHANGE UP (ref 81–99)
MCV RBC AUTO: 86.2 FL — SIGNIFICANT CHANGE UP (ref 81–99)
MONOCYTES # BLD AUTO: 0.78 K/UL — HIGH (ref 0.1–0.6)
MONOCYTES NFR BLD AUTO: 7.4 % — SIGNIFICANT CHANGE UP (ref 1.7–9.3)
NEUTROPHILS # BLD AUTO: 7.08 K/UL — HIGH (ref 1.4–6.5)
NEUTROPHILS NFR BLD AUTO: 66.8 % — SIGNIFICANT CHANGE UP (ref 42.2–75.2)
NRBC # BLD: 0 /100 WBCS — SIGNIFICANT CHANGE UP (ref 0–0)
NRBC # BLD: 0 /100 WBCS — SIGNIFICANT CHANGE UP (ref 0–0)
PLATELET # BLD AUTO: 197 K/UL — SIGNIFICANT CHANGE UP (ref 130–400)
PLATELET # BLD AUTO: 208 K/UL — SIGNIFICANT CHANGE UP (ref 130–400)
POTASSIUM SERPL-MCNC: 3.8 MMOL/L — SIGNIFICANT CHANGE UP (ref 3.5–5)
POTASSIUM SERPL-SCNC: 3.8 MMOL/L — SIGNIFICANT CHANGE UP (ref 3.5–5)
PROT SERPL-MCNC: 5.6 G/DL — LOW (ref 6–8)
RBC # BLD: 4.63 M/UL — SIGNIFICANT CHANGE UP (ref 4.2–5.4)
RBC # BLD: 4.74 M/UL — SIGNIFICANT CHANGE UP (ref 4.2–5.4)
RBC # FLD: 12.9 % — SIGNIFICANT CHANGE UP (ref 11.5–14.5)
RBC # FLD: 13 % — SIGNIFICANT CHANGE UP (ref 11.5–14.5)
SODIUM SERPL-SCNC: 139 MMOL/L — SIGNIFICANT CHANGE UP (ref 135–146)
TROPONIN T SERPL-MCNC: 1.11 NG/ML — CRITICAL HIGH
TROPONIN T SERPL-MCNC: 1.12 NG/ML — CRITICAL HIGH
TROPONIN T SERPL-MCNC: 1.19 NG/ML — CRITICAL HIGH
TROPONIN T SERPL-MCNC: 1.25 NG/ML — CRITICAL HIGH
TROPONIN T SERPL-MCNC: 1.44 NG/ML — CRITICAL HIGH
TSH SERPL-MCNC: 0.04 UIU/ML — LOW (ref 0.27–4.2)
WBC # BLD: 10.58 K/UL — SIGNIFICANT CHANGE UP (ref 4.8–10.8)
WBC # BLD: 9.11 K/UL — SIGNIFICANT CHANGE UP (ref 4.8–10.8)
WBC # FLD AUTO: 10.58 K/UL — SIGNIFICANT CHANGE UP (ref 4.8–10.8)
WBC # FLD AUTO: 9.11 K/UL — SIGNIFICANT CHANGE UP (ref 4.8–10.8)

## 2020-11-29 PROCEDURE — 93306 TTE W/DOPPLER COMPLETE: CPT | Mod: 26

## 2020-11-29 PROCEDURE — 93010 ELECTROCARDIOGRAM REPORT: CPT

## 2020-11-29 PROCEDURE — 71045 X-RAY EXAM CHEST 1 VIEW: CPT | Mod: 26

## 2020-11-29 PROCEDURE — 99291 CRITICAL CARE FIRST HOUR: CPT

## 2020-11-29 RX ORDER — PANTOPRAZOLE SODIUM 20 MG/1
40 TABLET, DELAYED RELEASE ORAL
Refills: 0 | Status: DISCONTINUED | OUTPATIENT
Start: 2020-11-29 | End: 2020-11-30

## 2020-11-29 RX ORDER — POTASSIUM CHLORIDE 20 MEQ
10 PACKET (EA) ORAL ONCE
Refills: 0 | Status: DISCONTINUED | OUTPATIENT
Start: 2020-11-29 | End: 2020-11-29

## 2020-11-29 RX ORDER — POTASSIUM CHLORIDE 20 MEQ
20 PACKET (EA) ORAL ONCE
Refills: 0 | Status: COMPLETED | OUTPATIENT
Start: 2020-11-29 | End: 2020-11-29

## 2020-11-29 RX ORDER — MAGNESIUM SULFATE 500 MG/ML
2 VIAL (ML) INJECTION ONCE
Refills: 0 | Status: COMPLETED | OUTPATIENT
Start: 2020-11-29 | End: 2020-11-29

## 2020-11-29 RX ADMIN — PANTOPRAZOLE SODIUM 40 MILLIGRAM(S): 20 TABLET, DELAYED RELEASE ORAL at 17:28

## 2020-11-29 RX ADMIN — CLOPIDOGREL BISULFATE 75 MILLIGRAM(S): 75 TABLET, FILM COATED ORAL at 12:11

## 2020-11-29 RX ADMIN — Medication 1 PATCH: at 12:12

## 2020-11-29 RX ADMIN — Medication 1 PATCH: at 20:00

## 2020-11-29 RX ADMIN — CHLORHEXIDINE GLUCONATE 1 APPLICATION(S): 213 SOLUTION TOPICAL at 12:20

## 2020-11-29 RX ADMIN — Medication 81 MILLIGRAM(S): at 12:11

## 2020-11-29 RX ADMIN — Medication 50 MILLIEQUIVALENT(S): at 08:51

## 2020-11-29 RX ADMIN — Medication 50 GRAM(S): at 07:52

## 2020-11-29 RX ADMIN — Medication 200 MICROGRAM(S): at 05:09

## 2020-11-29 RX ADMIN — ATORVASTATIN CALCIUM 80 MILLIGRAM(S): 80 TABLET, FILM COATED ORAL at 21:12

## 2020-11-29 NOTE — PROGRESS NOTE ADULT - SUBJECTIVE AND OBJECTIVE BOX
NAIF DUONG 55y Female  MRN#: [id number]   Hospital Day: 1d    SUBJECTIVE  Patient is a 55y old Female who presents with a chief complaint of Chest pain, STEMI code (28 Nov 2020 12:50)  Currently admitted to medicine with the primary diagnosis of STEMI (ST elevation myocardial infarction)      INTERVAL HPI AND OVERNIGHT EVENTS:  Patient was examined and seen at bedside. This morning she is resting comfortably in bed and reports no issues or overnight events.    REVIEW OF SYMPTOMS:  CONSTITUTIONAL: No weakness, fevers or chills; No headaches  EYES: No visual changes, eye pain, or discharge  ENT: No vertigo; No ear pain or change in hearing; No sore throat or difficulty swallowing  NECK: No pain or stiffness  RESPIRATORY: No cough, wheezing, or hemoptysis; No shortness of breath  CARDIOVASCULAR: No chest pain or palpitations  GASTROINTESTINAL: No abdominal or epigastric pain; No nausea, vomiting, or hematemesis; No diarrhea or constipation; No melena or hematochezia  GENITOURINARY: No dysuria, frequency or hematuria  MUSCULOSKELETAL: No joint pain, no muscle pain, no weakness  NEUROLOGICAL: No numbness or weakness  SKIN: No itching or rashes    OBJECTIVE  PAST MEDICAL & SURGICAL HISTORY  Hypothyroid    GERD (gastroesophageal reflux disease)    Anemia      ALLERGIES:  No Known Allergies    MEDICATIONS:  STANDING MEDICATIONS  aspirin  chewable 81 milliGRAM(s) Oral daily  atorvastatin 80 milliGRAM(s) Oral at bedtime  chlorhexidine 4% Liquid 1 Application(s) Topical daily  clopidogrel Tablet 75 milliGRAM(s) Oral daily  levothyroxine 200 MICROGram(s) Oral daily  lisinopril 10 milliGRAM(s) Oral daily  magnesium sulfate  IVPB 2 Gram(s) IV Intermittent once  nicotine -   7 mG/24Hr(s) Patch 1 patch Transdermal daily  pantoprazole Infusion 8 mG/Hr IV Continuous <Continuous>  potassium chloride  20 mEq/100 mL IVPB 20 milliEquivalent(s) IV Intermittent once  sodium chloride 0.9%. 1000 milliLiter(s) IV Continuous <Continuous>  sodium chloride 0.9%. 1000 milliLiter(s) IV Continuous <Continuous>    PRN MEDICATIONS  morphine  - Injectable 1 milliGRAM(s) IV Push three times a day PRN  ondansetron Injectable 4 milliGRAM(s) IV Push three times a day PRN      VITAL SIGNS: Last 24 Hours  T(C): 36.4 (29 Nov 2020 04:00), Max: 36.5 (28 Nov 2020 20:00)  T(F): 97.6 (29 Nov 2020 04:00), Max: 97.7 (28 Nov 2020 20:00)  HR: 52 (29 Nov 2020 07:00) (44 - 70)  BP: 96/59 (29 Nov 2020 07:00) (96/59 - 175/87)  BP(mean): 90 (29 Nov 2020 06:00) (67 - 132)  RR: 24 (29 Nov 2020 07:00) (16 - 42)  SpO2: 95% (29 Nov 2020 07:00) (95% - 100%)    LABS:                        13.5   10.58 )-----------( 208      ( 29 Nov 2020 05:16 )             40.0     11-29    139  |  104  |  10  ----------------------------<  93  3.8   |  21  |  0.6<L>    Ca    8.7      29 Nov 2020 05:16  Mg     1.8     11-29    TPro  5.6<L>  /  Alb  3.5  /  TBili  0.4  /  DBili  x   /  AST  65<H>  /  ALT  24  /  AlkPhos  80  11-29    PT/INR - ( 28 Nov 2020 16:51 )   PT: 11.40 sec;   INR: 0.99 ratio         PTT - ( 28 Nov 2020 16:51 )  PTT:31.2 sec      Creatine Kinase, Serum: 683 U/L <H> (11-29-20 @ 05:16)  Troponin T, Serum: 1.25 ng/mL <HH> (11-29-20 @ 05:16)  Troponin T, Serum: 1.44 ng/mL <HH> (11-29-20 @ 00:32)  Creatine Kinase, Serum: 1388 U/L <H> (11-28-20 @ 16:51)  Troponin T, Serum: 2.53 ng/mL <HH> (11-28-20 @ 16:51)      CARDIAC MARKERS ( 29 Nov 2020 05:16 )  x     / 1.25 ng/mL / 683 U/L / x     / 50.0 ng/mL  CARDIAC MARKERS ( 29 Nov 2020 00:32 )  x     / 1.44 ng/mL / x     / x     / x      CARDIAC MARKERS ( 28 Nov 2020 16:51 )  x     / 2.53 ng/mL / 1388 U/L / x     / 217.7 ng/mL  CARDIAC MARKERS ( 28 Nov 2020 05:40 )  x     / 0.83 ng/mL / 741 U/L / x     / 129.4 ng/mL      RADIOLOGY:      PHYSICAL EXAM:  CONSTITUTIONAL: No acute distress, well-developed, well-groomed, AAOx3  HEAD: Atraumatic, normocephalic  EYES: EOM intact, PERRLA, conjunctiva and sclera clear  ENT: Supple, no masses, no thyromegaly, no bruits, no JVD; moist mucous membranes  PULMONARY: Clear to auscultation bilaterally; no wheezes, rales, or rhonchi  CARDIOVASCULAR: Regular rate and rhythm; no murmurs, rubs, or gallops  GASTROINTESTINAL: Soft, non-tender, non-distended; bowel sounds present  MUSCULOSKELETAL: 2+ peripheral pulses; no clubbing, no cyanosis, no edema  NEUROLOGY: non-focal  SKIN: No rashes or lesions; warm and dry     NAIF DUONG 55y Female  MRN#: [id number]   Hospital Day: 1d    SUBJECTIVE  Patient is a 55y old Female who presents with a chief complaint of Chest pain, STEMI code (28 Nov 2020 12:50)  Currently admitted to medicine with the primary diagnosis of STEMI (ST elevation myocardial infarction)      INTERVAL HPI AND OVERNIGHT EVENTS:  Patient was examined and seen at bedside. This morning she is resting comfortably in bed and reports no issues or overnight events. Had episode of vomiting blood lat nigh. No bleeding today. BP is borderline low. Still bradycardic.    REVIEW OF SYMPTOMS:  CONSTITUTIONAL: No weakness, fevers or chills; No headaches  EYES: No visual changes, eye pain, or discharge  ENT: No vertigo; No ear pain or change in hearing; No sore throat or difficulty swallowing  NECK: No pain or stiffness  RESPIRATORY: No cough, wheezing, or hemoptysis; No shortness of breath  CARDIOVASCULAR: No chest pain or palpitations  GASTROINTESTINAL: No abdominal or epigastric pain; No nausea, vomiting, or hematemesis; No diarrhea or constipation; No melena or hematochezia  GENITOURINARY: No dysuria, frequency or hematuria  MUSCULOSKELETAL: No joint pain, no muscle pain, no weakness  NEUROLOGICAL: No numbness or weakness  SKIN: No itching or rashes    OBJECTIVE  PAST MEDICAL & SURGICAL HISTORY  Hypothyroid    GERD (gastroesophageal reflux disease)    Anemia      ALLERGIES:  No Known Allergies    MEDICATIONS:  STANDING MEDICATIONS  aspirin  chewable 81 milliGRAM(s) Oral daily  atorvastatin 80 milliGRAM(s) Oral at bedtime  chlorhexidine 4% Liquid 1 Application(s) Topical daily  clopidogrel Tablet 75 milliGRAM(s) Oral daily  levothyroxine 200 MICROGram(s) Oral daily  lisinopril 10 milliGRAM(s) Oral daily  magnesium sulfate  IVPB 2 Gram(s) IV Intermittent once  nicotine -   7 mG/24Hr(s) Patch 1 patch Transdermal daily  pantoprazole Infusion 8 mG/Hr IV Continuous <Continuous>  potassium chloride  20 mEq/100 mL IVPB 20 milliEquivalent(s) IV Intermittent once  sodium chloride 0.9%. 1000 milliLiter(s) IV Continuous <Continuous>  sodium chloride 0.9%. 1000 milliLiter(s) IV Continuous <Continuous>    PRN MEDICATIONS  morphine  - Injectable 1 milliGRAM(s) IV Push three times a day PRN  ondansetron Injectable 4 milliGRAM(s) IV Push three times a day PRN      VITAL SIGNS: Last 24 Hours  T(C): 36.4 (29 Nov 2020 04:00), Max: 36.5 (28 Nov 2020 20:00)  T(F): 97.6 (29 Nov 2020 04:00), Max: 97.7 (28 Nov 2020 20:00)  HR: 52 (29 Nov 2020 07:00) (44 - 70)  BP: 96/59 (29 Nov 2020 07:00) (96/59 - 175/87)  BP(mean): 90 (29 Nov 2020 06:00) (67 - 132)  RR: 24 (29 Nov 2020 07:00) (16 - 42)  SpO2: 95% (29 Nov 2020 07:00) (95% - 100%)    LABS:                        13.5   10.58 )-----------( 208      ( 29 Nov 2020 05:16 )             40.0     11-29    139  |  104  |  10  ----------------------------<  93  3.8   |  21  |  0.6<L>    Ca    8.7      29 Nov 2020 05:16  Mg     1.8     11-29    TPro  5.6<L>  /  Alb  3.5  /  TBili  0.4  /  DBili  x   /  AST  65<H>  /  ALT  24  /  AlkPhos  80  11-29    PT/INR - ( 28 Nov 2020 16:51 )   PT: 11.40 sec;   INR: 0.99 ratio         PTT - ( 28 Nov 2020 16:51 )  PTT:31.2 sec      Creatine Kinase, Serum: 683 U/L <H> (11-29-20 @ 05:16)  Troponin T, Serum: 1.25 ng/mL <HH> (11-29-20 @ 05:16)  Troponin T, Serum: 1.44 ng/mL <HH> (11-29-20 @ 00:32)  Creatine Kinase, Serum: 1388 U/L <H> (11-28-20 @ 16:51)  Troponin T, Serum: 2.53 ng/mL <HH> (11-28-20 @ 16:51)      CARDIAC MARKERS ( 29 Nov 2020 05:16 )  x     / 1.25 ng/mL / 683 U/L / x     / 50.0 ng/mL  CARDIAC MARKERS ( 29 Nov 2020 00:32 )  x     / 1.44 ng/mL / x     / x     / x      CARDIAC MARKERS ( 28 Nov 2020 16:51 )  x     / 2.53 ng/mL / 1388 U/L / x     / 217.7 ng/mL  CARDIAC MARKERS ( 28 Nov 2020 05:40 )  x     / 0.83 ng/mL / 741 U/L / x     / 129.4 ng/mL      RADIOLOGY:      PHYSICAL EXAM:  CONSTITUTIONAL: No acute distress, well-developed, well-groomed, AAOx3  HEAD: Atraumatic, normocephalic  EYES: EOM intact, PERRLA, conjunctiva and sclera clear  ENT: Supple, no masses, no thyromegaly, no bruits, no JVD; moist mucous membranes  PULMONARY: Clear to auscultation bilaterally; no wheezes, rales, or rhonchi  CARDIOVASCULAR: Regular rate and rhythm; no murmurs, rubs, or gallops  GASTROINTESTINAL: Soft, non-tender, non-distended; bowel sounds present  MUSCULOSKELETAL: 2+ peripheral pulses; no clubbing, no cyanosis, no edema  NEUROLOGY: non-focal  SKIN: No rashes or lesions; warm and dry

## 2020-11-29 NOTE — PROGRESS NOTE ADULT - ASSESSMENT
55 female with hypothyroidism presented for chest pain since 9 pm associated with 2 episodes of non-bloody, non-bilious vomiting. STEMI code, s/p urgent cath/PCI.  She was found to have 100% thrombotic occlusion of the LCX and PCI was performed. LV EF was moderately depressed (EF 35% by LVG). currently on dual antiplatelets ASA and plavix      *1 episode of hematemesis   -GI called for nausea and vomiting with mild red tinged vomitus (150mL per the nurse after she ate jello) no evidence of ongoing active GI Bleeding  -HD stable, Hb stable   -on dual antiplatelets for acute STEMI  -PPI drip can be switched to po BID  -2 large bore IV  -keep active type and screen  -can advance diet as tolerated   -no endoscopy at this point but might benefit from outpatient EGD   -will sign off call PRN

## 2020-11-29 NOTE — PROGRESS NOTE ADULT - SUBJECTIVE AND OBJECTIVE BOX
Gastroenterology progress note:     Patient is a 55y old  Female who presents with a chief complaint of STEMI (29 Nov 2020 07:25)       Admitted on: 11-28-20    We are following the patient for 1 episode of hematemesis yesterday      Interval History: no BM overnight, no hematemesis pain resolved, no nausea. she is NPO    Patient's medical problems are improving    Prior records reviewed (Y/N): Y  History obtained from someone other than patient (Y/N): N      PAST MEDICAL & SURGICAL HISTORY:  Hypothyroid  GERD (gastroesophageal reflux disease)  Anemia        MEDICATIONS  (STANDING):  aspirin  chewable 81 milliGRAM(s) Oral daily  atorvastatin 80 milliGRAM(s) Oral at bedtime  chlorhexidine 4% Liquid 1 Application(s) Topical daily  clopidogrel Tablet 75 milliGRAM(s) Oral daily  levothyroxine 200 MICROGram(s) Oral daily  lisinopril 10 milliGRAM(s) Oral daily  nicotine -   7 mG/24Hr(s) Patch 1 patch Transdermal daily  pantoprazole Infusion 8 mG/Hr (10 mL/Hr) IV Continuous <Continuous>  sodium chloride 0.9%. 1000 milliLiter(s) (50 mL/Hr) IV Continuous <Continuous>  sodium chloride 0.9%. 1000 milliLiter(s) (75 mL/Hr) IV Continuous <Continuous>    MEDICATIONS  (PRN):  morphine  - Injectable 1 milliGRAM(s) IV Push three times a day PRN Moderate Pain (4 - 6)  ondansetron Injectable 4 milliGRAM(s) IV Push three times a day PRN Nausea and/or Vomiting      Allergies  No Known Allergies      Review of Systems:   General: no fever  HEENT: no hemoptysis  Cardiovascular:  No Chest Pain, No Palpitations  Respiratory:  No Cough, No Dyspnea  Gastrointestinal:  As described in HPI  Hematology: no bruising or hematoma   Neurology: no new motor deficit  Skin: no new rash    Physical Examination:  T(C): 36.5 (11-29-20 @ 08:00), Max: 36.5 (11-28-20 @ 20:00)  HR: 46 (11-29-20 @ 08:00) (44 - 70)  BP: 116/56 (11-29-20 @ 08:00) (96/59 - 175/87)  RR: 25 (11-29-20 @ 08:00) (16 - 42)  SpO2: 95% (11-29-20 @ 08:00) (95% - 100%)      11-28-20 @ 07:01  -  11-29-20 @ 07:00  --------------------------------------------------------  IN: 1415 mL / OUT: 1600 mL / NET: -185 mL    11-29-20 @ 07:01  -  11-29-20 @ 13:06  --------------------------------------------------------  IN: 60 mL / OUT: 100 mL / NET: -40 mL        Constitutional: No acute distress.  Head: normocephalic  Neck: no palpable thyroid  Eyes: EOMI  Respiratory:  No signs of respiratory distress. Lung sounds are clear bilaterally.  Cardiovascular:  S1 S2, Regular rate and rhythm.  Abdominal: Abdomen is soft, symmetric, and non-tender without distention.    Extremities: no pitting edema  Skin: No rashes, No Jaundice.        Data: (reviewed by attending)                        13.5   10.58 )-----------( 208      ( 29 Nov 2020 05:16 )             40.0     Hgb trend:  13.5  11-29-20 @ 05:16  14.7  11-28-20 @ 16:51  14.8  11-28-20 @ 11:31  15.4  11-28-20 @ 05:40        11-29    139  |  104  |  10  ----------------------------<  93  3.8   |  21  |  0.6<L>    Ca    8.7      29 Nov 2020 05:16  Mg     1.8     11-29    TPro  5.6<L>  /  Alb  3.5  /  TBili  0.4  /  DBili  x   /  AST  65<H>  /  ALT  24  /  AlkPhos  80  11-29    Liver panel trend:  TBili 0.4   /   AST 65   /   ALT 24   /   AlkP 80   /   Tptn 5.6   /   Alb 3.5    /   DBili --      11-29  TBili 0.3   /   AST 50   /   ALT 20   /   AlkP 102   /   Tptn 6.6   /   Alb 4.3    /   DBili --      11-28      PT/INR - ( 28 Nov 2020 16:51 )   PT: 11.40 sec;   INR: 0.99 ratio         PTT - ( 28 Nov 2020 16:51 )  PTT:31.2 sec       Radiology: (reviewed by attending)  none

## 2020-11-29 NOTE — PROGRESS NOTE ADULT - ASSESSMENT
ACS- S/p Cath and stenting in Left Circum flex.     PLAN:    CNS: avoid sedatives    HEENT: Oral care    PULMONARY:  HOB @ 45 degrees    CARDIOVASCULAR:  still no bb due to bradycardia.  cw asa, plavix, lipitor 80, lisinopril 10  need further gi f/u (on ppi gtt -- endoscopy?)      GI: Follow up with GI. Protonix IV.      RENAL:  Follow up renal function and lytes.  Correct as needed    INFECTIOUS DISEASE: Monitor VS    HEMATOLOGICAL:  DVT prophylaxis    ENDOCRINE:  Follow up FS.  Insulin protocol if needed. ACS- S/p Cath and stenting in Left Circumflex for an acute MI, course complicated by acute GI bleed.     PLAN:    CNS: avoid sedatives    HEENT: Oral care    PULMONARY:  HOB @ 45 degrees    CARDIOVASCULAR:  still no bb due to bradycardia.  cw asa, plavix, lipitor 80, lisinopril 10, monitor for bleeding  need further gi f/u (on ppi gtt -- endoscopy?)  Check 2D echo - had reduced EF on cath.      GI: Follow up with GI. Protonix IV.  Will switch to IV BID form the drip. GI f/u for possible EGD, depending on the course. Will have to keep on DAPT, unless she has lifethreatening bleeding, given recent MI and stent placement.    RENAL:  Follow up renal function and lytes.  Correct as needed    INFECTIOUS DISEASE: Monitor VS, off antibiotics    HEMATOLOGICAL:  DVT prophylaxis - Lovenox. Monitor CBC in the setting of GIB    ENDOCRINE:  Follow up FS.  Insulin protocol if needed. Check TSH IMPRESSION  NSTEMI - S/p Cath 11/28,  graeme to distal lcx.    GIB (Blood tinged vomitus)    PLAN:    CNS: avoid sedatives    HEENT: Oral care    PULMONARY:  HOB @ 45 degrees    CARDIOVASCULAR:  still no bb due to bradycardia.  cw asa, plavix, lipitor 80, lisinopril 10, monitor for bleeding  echo complete now - pending official read.   need further gi f/u (on ppi gtt -- endoscopy?)      GI: Follow up with GI. Protonix IV.  Will switch to IV BID form the drip. GI f/u for possible EGD, depending on the course. Will have to keep on DAPT, unless she has life threatening bleeding, given recent MI and stent placement.    RENAL:  Follow up renal function and lytes.  Correct as needed    INFECTIOUS DISEASE: Monitor VS, off antibiotics    HEMATOLOGICAL:  DVT prophylaxis - Lovenox. Monitor CBC in the setting of GIB    ENDOCRINE:  Follow up FS.  Insulin protocol if needed. Check TSH

## 2020-11-30 ENCOUNTER — TRANSCRIPTION ENCOUNTER (OUTPATIENT)
Age: 55
End: 2020-11-30

## 2020-11-30 VITALS — RESPIRATION RATE: 22 BRPM | HEART RATE: 62 BPM | SYSTOLIC BLOOD PRESSURE: 134 MMHG | DIASTOLIC BLOOD PRESSURE: 64 MMHG

## 2020-11-30 LAB
ALBUMIN SERPL ELPH-MCNC: 3.5 G/DL — SIGNIFICANT CHANGE UP (ref 3.5–5.2)
ALP SERPL-CCNC: 84 U/L — SIGNIFICANT CHANGE UP (ref 30–115)
ALT FLD-CCNC: 23 U/L — SIGNIFICANT CHANGE UP (ref 0–41)
ANION GAP SERPL CALC-SCNC: 9 MMOL/L — SIGNIFICANT CHANGE UP (ref 7–14)
AST SERPL-CCNC: 34 U/L — SIGNIFICANT CHANGE UP (ref 0–41)
BASOPHILS # BLD AUTO: 0.03 K/UL — SIGNIFICANT CHANGE UP (ref 0–0.2)
BASOPHILS NFR BLD AUTO: 0.3 % — SIGNIFICANT CHANGE UP (ref 0–1)
BILIRUB SERPL-MCNC: 0.3 MG/DL — SIGNIFICANT CHANGE UP (ref 0.2–1.2)
BUN SERPL-MCNC: 18 MG/DL — SIGNIFICANT CHANGE UP (ref 10–20)
CALCIUM SERPL-MCNC: 8.8 MG/DL — SIGNIFICANT CHANGE UP (ref 8.5–10.1)
CHLORIDE SERPL-SCNC: 108 MMOL/L — SIGNIFICANT CHANGE UP (ref 98–110)
CO2 SERPL-SCNC: 22 MMOL/L — SIGNIFICANT CHANGE UP (ref 17–32)
CREAT SERPL-MCNC: 0.7 MG/DL — SIGNIFICANT CHANGE UP (ref 0.7–1.5)
EOSINOPHIL # BLD AUTO: 0.07 K/UL — SIGNIFICANT CHANGE UP (ref 0–0.7)
EOSINOPHIL NFR BLD AUTO: 0.8 % — SIGNIFICANT CHANGE UP (ref 0–8)
GLUCOSE SERPL-MCNC: 114 MG/DL — HIGH (ref 70–99)
HCT VFR BLD CALC: 40.5 % — SIGNIFICANT CHANGE UP (ref 37–47)
HCV AB S/CO SERPL IA: 0.05 COI — SIGNIFICANT CHANGE UP
HCV AB SERPL-IMP: SIGNIFICANT CHANGE UP
HGB BLD-MCNC: 13.5 G/DL — SIGNIFICANT CHANGE UP (ref 12–16)
IMM GRANULOCYTES NFR BLD AUTO: 0.5 % — HIGH (ref 0.1–0.3)
LYMPHOCYTES # BLD AUTO: 2.82 K/UL — SIGNIFICANT CHANGE UP (ref 1.2–3.4)
LYMPHOCYTES # BLD AUTO: 32.5 % — SIGNIFICANT CHANGE UP (ref 20.5–51.1)
MAGNESIUM SERPL-MCNC: 2.1 MG/DL — SIGNIFICANT CHANGE UP (ref 1.8–2.4)
MCHC RBC-ENTMCNC: 28.4 PG — SIGNIFICANT CHANGE UP (ref 27–31)
MCHC RBC-ENTMCNC: 33.3 G/DL — SIGNIFICANT CHANGE UP (ref 32–37)
MCV RBC AUTO: 85.1 FL — SIGNIFICANT CHANGE UP (ref 81–99)
MONOCYTES # BLD AUTO: 0.59 K/UL — SIGNIFICANT CHANGE UP (ref 0.1–0.6)
MONOCYTES NFR BLD AUTO: 6.8 % — SIGNIFICANT CHANGE UP (ref 1.7–9.3)
NEUTROPHILS # BLD AUTO: 5.14 K/UL — SIGNIFICANT CHANGE UP (ref 1.4–6.5)
NEUTROPHILS NFR BLD AUTO: 59.1 % — SIGNIFICANT CHANGE UP (ref 42.2–75.2)
NRBC # BLD: 0 /100 WBCS — SIGNIFICANT CHANGE UP (ref 0–0)
PLATELET # BLD AUTO: 201 K/UL — SIGNIFICANT CHANGE UP (ref 130–400)
POTASSIUM SERPL-MCNC: 3.7 MMOL/L — SIGNIFICANT CHANGE UP (ref 3.5–5)
POTASSIUM SERPL-SCNC: 3.7 MMOL/L — SIGNIFICANT CHANGE UP (ref 3.5–5)
PROT SERPL-MCNC: 5.3 G/DL — LOW (ref 6–8)
RBC # BLD: 4.76 M/UL — SIGNIFICANT CHANGE UP (ref 4.2–5.4)
RBC # FLD: 12.8 % — SIGNIFICANT CHANGE UP (ref 11.5–14.5)
SARS-COV-2 IGG SERPL QL IA: NEGATIVE — SIGNIFICANT CHANGE UP
SARS-COV-2 IGM SERPL IA-ACNC: <0.1 INDEX — SIGNIFICANT CHANGE UP
SODIUM SERPL-SCNC: 139 MMOL/L — SIGNIFICANT CHANGE UP (ref 135–146)
TROPONIN T SERPL-MCNC: 1.35 NG/ML — CRITICAL HIGH
WBC # BLD: 8.69 K/UL — SIGNIFICANT CHANGE UP (ref 4.8–10.8)
WBC # FLD AUTO: 8.69 K/UL — SIGNIFICANT CHANGE UP (ref 4.8–10.8)

## 2020-11-30 PROCEDURE — 71045 X-RAY EXAM CHEST 1 VIEW: CPT | Mod: 26

## 2020-11-30 PROCEDURE — 99239 HOSP IP/OBS DSCHRG MGMT >30: CPT

## 2020-11-30 PROCEDURE — 93010 ELECTROCARDIOGRAM REPORT: CPT

## 2020-11-30 RX ORDER — PANTOPRAZOLE SODIUM 20 MG/1
1 TABLET, DELAYED RELEASE ORAL
Qty: 30 | Refills: 0
Start: 2020-11-30 | End: 2020-12-29

## 2020-11-30 RX ORDER — NICOTINE POLACRILEX 2 MG
1 GUM BUCCAL
Qty: 14 | Refills: 0
Start: 2020-11-30 | End: 2020-12-13

## 2020-11-30 RX ORDER — LEVOTHYROXINE SODIUM 125 MCG
1 TABLET ORAL
Qty: 0 | Refills: 0 | DISCHARGE

## 2020-11-30 RX ORDER — LEVOTHYROXINE SODIUM 125 MCG
1 TABLET ORAL
Qty: 30 | Refills: 0
Start: 2020-11-30 | End: 2020-12-29

## 2020-11-30 RX ORDER — POTASSIUM CHLORIDE 20 MEQ
20 PACKET (EA) ORAL ONCE
Refills: 0 | Status: COMPLETED | OUTPATIENT
Start: 2020-11-30 | End: 2020-11-30

## 2020-11-30 RX ORDER — CLOPIDOGREL BISULFATE 75 MG/1
1 TABLET, FILM COATED ORAL
Qty: 60 | Refills: 0
Start: 2020-11-30 | End: 2021-01-28

## 2020-11-30 RX ORDER — POTASSIUM CHLORIDE 20 MEQ
40 PACKET (EA) ORAL ONCE
Refills: 0 | Status: COMPLETED | OUTPATIENT
Start: 2020-11-30 | End: 2020-11-30

## 2020-11-30 RX ORDER — ATORVASTATIN CALCIUM 80 MG/1
1 TABLET, FILM COATED ORAL
Qty: 0 | Refills: 0 | DISCHARGE

## 2020-11-30 RX ORDER — LISINOPRIL 2.5 MG/1
1 TABLET ORAL
Qty: 60 | Refills: 0
Start: 2020-11-30 | End: 2021-01-28

## 2020-11-30 RX ORDER — ASPIRIN/CALCIUM CARB/MAGNESIUM 324 MG
1 TABLET ORAL
Qty: 60 | Refills: 0
Start: 2020-11-30 | End: 2021-01-28

## 2020-11-30 RX ORDER — ATORVASTATIN CALCIUM 80 MG/1
1 TABLET, FILM COATED ORAL
Qty: 60 | Refills: 0
Start: 2020-11-30 | End: 2021-01-28

## 2020-11-30 RX ADMIN — LISINOPRIL 10 MILLIGRAM(S): 2.5 TABLET ORAL at 05:07

## 2020-11-30 RX ADMIN — Medication 1 PATCH: at 11:57

## 2020-11-30 RX ADMIN — Medication 200 MICROGRAM(S): at 05:07

## 2020-11-30 RX ADMIN — Medication 81 MILLIGRAM(S): at 11:57

## 2020-11-30 RX ADMIN — PANTOPRAZOLE SODIUM 40 MILLIGRAM(S): 20 TABLET, DELAYED RELEASE ORAL at 05:07

## 2020-11-30 RX ADMIN — Medication 1 PATCH: at 06:08

## 2020-11-30 RX ADMIN — Medication 40 MILLIEQUIVALENT(S): at 10:58

## 2020-11-30 RX ADMIN — CHLORHEXIDINE GLUCONATE 1 APPLICATION(S): 213 SOLUTION TOPICAL at 12:01

## 2020-11-30 RX ADMIN — CLOPIDOGREL BISULFATE 75 MILLIGRAM(S): 75 TABLET, FILM COATED ORAL at 11:57

## 2020-11-30 RX ADMIN — Medication 1 PATCH: at 11:30

## 2020-11-30 NOTE — PROGRESS NOTE ADULT - ASSESSMENT
IMPRESSION  NSTEMI - S/p Cath 11/28,  graeme to distal lcx.    GIB (Blood tinged vomitus) - resolved, Hb stable    PLAN:    CNS: avoid sedatives    HEENT: Oral care    PULMONARY:  HOB @ 45 degrees    CARDIOVASCULAR:  Continue aspirin, plavix, lipitor 80, lisinopril 10  Patient is bradycardic; no beta-blockers  2D echo reviewed  Patient stable for discharge today      GI:  Hb stable; hemodynamically stable  Can switch to PO protonix  Continue aspirin and plavix  Discharge home today with outpatient GI follow-up      RENAL:  Follow up renal function and lytes.  Correct as needed    INFECTIOUS DISEASE: Monitor VS, off antibiotics    HEMATOLOGICAL:  DVT prophylaxis     ENDOCRINE:  Follow up FS.  Insulin protocol if needed; continue Synthroid IMPRESSION  NSTEMI - S/p Cath 11/28,  graeme to distal lcx.    GIB (Blood tinged vomitus) - resolved, Hb stable    PLAN:    CNS: avoid sedatives    HEENT: Oral care    PULMONARY:  HOB @ 45 degrees    CARDIOVASCULAR:  Continue aspirin, plavix, lipitor 80, lisinopril 10  Patient is bradycardic; no beta-blockers  2D echo reviewed  Patient stable for discharge today      GI:  Hb stable; hemodynamically stable  Can switch to PO protonix  Continue aspirin and plavix  Discharge home today with outpatient GI follow-up      RENAL:  Follow up renal function and lytes.  Correct as needed    INFECTIOUS DISEASE: Monitor VS, off antibiotics    HEMATOLOGICAL:  DVT prophylaxis     ENDOCRINE:  Follow up FS.  Insulin protocol if needed; continue Synthroid, TSH noted - outpatient endocrine f/u.    38 minutes in patient care, discharge planning.

## 2020-11-30 NOTE — DISCHARGE NOTE PROVIDER - CARE PROVIDER_API CALL
Herbert Washington)  Cardiovascular Disease; Internal Medicine; Interventional Cardiology  [address], [facility] [address]  [place] [address]  Phone: [phone]  Fax: [phone]  Follow Up Time: 2 weeks    April Boyd  [address] [place] [address] [address] [place] [address]  Phone: [phone]  Fax: (   )    -  Established Patient  Follow Up Time: 2 weeks    Eddy Fry)  Gastroenterology; Internal Medicine  [address] [place] [id number]  Phone: [phone]  Fax: [phone]  Follow Up Time: 1 month

## 2020-11-30 NOTE — DISCHARGE NOTE PROVIDER - HOSPITAL COURSE
56 y/o female with PMHx of hypothyroidism and gastritis presented for chest pain associated with 2 episodes of non-bloody, non-bilious vomiting. She initially presented to the [facility] side. Her ECG at the [facility] ER did not meet criteria for STEMI, but had evidence of hyperacute T-waves, and she was transferred to [place] ER. The patient had persistent severe chest pain and the decision was made to proceed with urgent cath/PCI. The patient is S/p cath, VICKEY to distal LCX. She had an episode of nausea and vomiting with mild red tinged vomitus. She denies any chest pain, but does say she has really bad gastritis and she has been taking Pepto-Bismol and omeprazole outpatient.   Echo showed EF 58%. Continue with aspirin, plavix, lipitor, lisinopril. Hold metoprolol due to persistent bradycardia.    Patient was placed on Protonix drip in the hospital. No other episodes of vomiting. Transitioned to Protonix po. Will need EGD as outpatient. Given that her hemaglobin was stable and no repeat bloody vomitus, continue with DAPT.

## 2020-11-30 NOTE — DISCHARGE NOTE NURSING/CASE MANAGEMENT/SOCIAL WORK - NSDCPEEMAIL_GEN_ALL_CORE
[facility] email [email or web address]

## 2020-11-30 NOTE — DISCHARGE NOTE PROVIDER - CARE PROVIDERS DIRECT ADDRESSES
[email or web address]

## 2020-11-30 NOTE — PROGRESS NOTE ADULT - SUBJECTIVE AND OBJECTIVE BOX
HPI:  55 female with hypothyroidism presented for chest pain since 9 pm associated with 2 episodes of non-bloody, non-bilious vomiting. She initially presented tot [facility]. Her ECG at the [facility] did not meet criteria for STEMI, but had evidence of hyperacute T-waves, and she was transferred to [place] [facility]. The patient had persistent severe chest pain and the decision was made to proceed with urgent cath/PCI. The patient is S/p cath, currently hemodynamically stable. She had an episode of nausea and vomiting with mild red tinged vomitus. She denies any chest pain, but does say she has really bad gastritis and she has been taking Pepto-Bismol and omeprazole outpatient.    (2020 12:37)      INTERVAL HISTORY:    Patient was examined at bedside. She does not have any acute complaints. She reports that her nausea and vomiting has improved. She was able to tolerate her diet well. She reports not having a bowel movement since , but her diet was added yesterday.     PAST MEDICAL & SURGICAL HISTORY  Hypothyroid    GERD (gastroesophageal reflux disease)    Anemia        ALLERGIES:  No Known Allergies      MEDICATIONS:  MEDICATIONS  (STANDING):  aspirin  chewable 81 milliGRAM(s) Oral daily  atorvastatin 80 milliGRAM(s) Oral at bedtime  chlorhexidine 4% Liquid 1 Application(s) Topical daily  clopidogrel Tablet 75 milliGRAM(s) Oral daily  levothyroxine 200 MICROGram(s) Oral daily  lisinopril 10 milliGRAM(s) Oral daily  nicotine -   7 mG/24Hr(s) Patch 1 patch Transdermal daily  pantoprazole    Tablet 40 milliGRAM(s) Oral two times a day  sodium chloride 0.9%. 1000 milliLiter(s) (75 mL/Hr) IV Continuous <Continuous>    MEDICATIONS  (PRN):  morphine  - Injectable 1 milliGRAM(s) IV Push three times a day PRN Moderate Pain (4 - 6)  ondansetron Injectable 4 milliGRAM(s) IV Push three times a day PRN Nausea and/or Vomiting      HOME MEDICATIONS:  Home Medications:  levothyroxine 200 mcg (0.2 mg) oral capsule: 1 cap(s) orally once a day (2020 12:36)  Lipitor 20 mg oral tablet: 1 tab(s) orally once a day (2020 12:37)        OBJECTIVE:  ICU Vital Signs Last 24 Hrs  T(C): 36.5 (2020 04:00), Max: 36.6 (2020 20:00)  T(F): 97.7 (:00), Max: 97.9 (2020 20:00)  HR: 58 (:00) (46 - 64)  BP: 142/68 (:) (106/49 - 155/73)  BP(mean): 96 (:) (70 - 107)  ABP: --  ABP(mean): --  RR: 19 (:) (17 - 34)  SpO2: 97% (:) (95% - 99%)      2020 07:01  -  2020 07:00  --------------------------------------------------------  IN: 870 mL / OUT: 100 mL / NET: 770 mL      Daily     Daily Weight in k.4 (2020 00:00)    PHYSICAL EXAM:  NEURO: patient is awake , alert and oriented  GEN: Not in acute distress  NECK: no thyroid enlargement, no JVD  LUNGS: Clear to auscultation bilaterally   CARDIOVASCULAR: S1/S2 present, RRR , no murmurs or rubs, no carotid bruits,  + PP bilaterally  ABD: Soft, non-tender, non-distended, +BS  EXT: No MEENA  SKIN: Intact  ACCESS Site: right radial site clean, no tenderness, no bleeding     LABS:                        13.5   8.69  )-----------( 201      ( 2020 04:47 )             40.5     11-30    139  |  108  |  18  ----------------------------<  114<H>  3.7   |  22  |  0.7    Ca    8.8      2020 04:47  Mg     2.1     11-30    TPro  5.3<L>  /  Alb  3.5  /  TBili  0.3  /  DBili  x   /  AST  34  /  ALT  23  /  AlkPhos  84  11-30    PT/INR - ( 2020 16:51 )   PT: 11.40 sec;   INR: 0.99 ratio         PTT - ( 2020 16:51 )  PTT:31.2 sec  Troponin T, Serum: 1.35 ng/mL <HH> (20 @ 04:47)  Troponin T, Serum: 1.12 ng/mL <HH> (20 @ 21:38)  Troponin T, Serum: 1.11 ng/mL <HH> (20 @ 16:29)  Troponin T, Serum: 1.19 ng/mL <HH> (20 @ 12:06)    CARDIAC MARKERS ( 2020 04:47 )  x     / 1.35 ng/mL / x     / x     / x      CARDIAC MARKERS ( 2020 21:38 )  x     / 1.12 ng/mL / x     / x     / x      CARDIAC MARKERS ( 2020 16:29 )  x     / 1.11 ng/mL / x     / x     / x      CARDIAC MARKERS ( 2020 12:06 )  x     / 1.19 ng/mL / x     / x     / x      CARDIAC MARKERS ( 2020 05:16 )  x     / 1.25 ng/mL / 683 U/L / x     / 50.0 ng/mL  CARDIAC MARKERS ( 2020 00:32 )  x     / 1.44 ng/mL / x     / x     / x      CARDIAC MARKERS ( 2020 16:51 )  x     / 2.53 ng/mL / 1388 U/L / x     / 217.7 ng/mL        Troponin trend:       Chol 219<H> LDL -- HDL 54 Trig 60      RADIOLOGY:  -CXR:    EXAM:  XR CHEST PORTABLE ROUTINE 1V            PROCEDURE DATE:  2020            INTERPRETATION:  Clinical History / Reason for exam: Dyspnea    Comparison : Chest radiograph 2020.    Technique/Positioning: Frontal.    Findings:    Support devices: None.    Cardiac/mediastinum/hilum: Unremarkable.    Lung parenchyma/Pleura: Within normal limits.    Skeleton/soft tissues: Unremarkable.    Impression:    No radiographic evidence of acute cardiopulmonary disease.    -TTE:  Summary:   1. Mid and apical inferior wall and mid inferolateral segment are abnormal as described above.   2. LV Ejection Fraction by Fitzpatrick's Method with a biplane EF of 58 %.   3. Normal left atrial size.   4. Normal right atrial size.   5. Mild mitral valve regurgitation.   6. Mild tricuspid regurgitation.      -CATHETERIZATION:  LEFT HEART CATHETERIZATION                                    Left main: luminal irregularities  LAD:  mild disease mid 20%  Diag: mild disease   Left Circumflex: co-dominant proximal 30-40 % stenosis, 100% distal segment occlusion with thrombus  OM: mild disease  Right Coronary Artery: mild to moderate disease  RPDA: mild disease, RPLV - ostial 60%    ECG:    Ventricular Rate 49 BPM    Atrial Rate 49 BPM    P-R Interval 148 ms    QRS Duration 80 ms    Q-T Interval 516 ms    QTC Calculation(Bazett) 466 ms    P Axis 74 degrees    R Axis 69 degrees    T Axis -59 degrees    Diagnosis Line Sinus bradycardia with sinus arrhythmia  T wave abnormality, consider inferior ischemia  Abnormal ECG    TELEMETRY EVENTS:  none overnight

## 2020-11-30 NOTE — DISCHARGE NOTE PROVIDER - NSDCCPCAREPLAN_GEN_ALL_CORE_FT
PRINCIPAL DISCHARGE DIAGNOSIS  Diagnosis: STEMI (ST elevation myocardial infarction)  Assessment and Plan of Treatment: You had a myocardial infarction. You had a stent placed in the blood vessel of your heart. You will need to take Aspirin and Plavix to prevent clotting of your stent. It is important you take these medications consistently. You will also be on Lisinopril for blood pressure control and Lipitor to decrease the risk of another myocardial infarction.      SECONDARY DISCHARGE DIAGNOSES  Diagnosis: Hypothyroidism  Assessment and Plan of Treatment: Continue with synthroid.     PRINCIPAL DISCHARGE DIAGNOSIS  Diagnosis: STEMI (ST elevation myocardial infarction)  Assessment and Plan of Treatment: You had a myocardial infarction. You had a stent placed in the blood vessel of your heart. You will need to take Aspirin and Plavix to prevent clotting of your stent. It is important you take these medications consistently. You will also be on Lisinopril for blood pressure control and Lipitor to decrease the risk of another myocardial infarction. Please follow up with Cardiologist Dr. Washington within 2 weeks.      SECONDARY DISCHARGE DIAGNOSES  Diagnosis: GERD (gastroesophageal reflux disease)  Assessment and Plan of Treatment: You have history of gastritis. You will likely need an upper endoscopy as an outpatient. You will need to follow up with a gastroenterologist.    Diagnosis: Hypothyroidism  Assessment and Plan of Treatment: Continue with synthroid.     PRINCIPAL DISCHARGE DIAGNOSIS  Diagnosis: STEMI (ST elevation myocardial infarction)  Assessment and Plan of Treatment: You had a myocardial infarction. You had a stent placed in the blood vessel of your heart. You will need to take Aspirin and Plavix to prevent clotting of your stent. It is important you take these medications consistently. You will also be on Lisinopril for blood pressure control and Lipitor to decrease the risk of another myocardial infarction. Please follow up with Cardiologist Dr. Washington within 2 weeks.      SECONDARY DISCHARGE DIAGNOSES  Diagnosis: GERD (gastroesophageal reflux disease)  Assessment and Plan of Treatment: You have history of gastritis. You will likely need an upper endoscopy as an outpatient. You will need to follow up with a gastroenterologist.    Diagnosis: Hypothyroidism  Assessment and Plan of Treatment: Continue with synthroid. Follow up with your primary care doctor     PRINCIPAL DISCHARGE DIAGNOSIS  Diagnosis: STEMI (ST elevation myocardial infarction)  Assessment and Plan of Treatment: You had a myocardial infarction. You had a stent placed in the blood vessel of your heart. You will need to take Aspirin and Plavix to prevent clotting of your stent. It is important you take these medications consistently. You will also be on Lisinopril for blood pressure control and Lipitor to decrease the risk of another myocardial infarction. Please follow up with Cardiologist Dr. Washington within 2 weeks.      SECONDARY DISCHARGE DIAGNOSES  Diagnosis: Bradycardia  Assessment and Plan of Treatment: You had a low heart rate when you sleep. Please follow up with your primary care doctor for close monitoring. You may need a sleep study.    Diagnosis: GERD (gastroesophageal reflux disease)  Assessment and Plan of Treatment: You have history of gastritis. You will likely need an upper endoscopy as an outpatient. You will need to follow up with a gastroenterologist.    Diagnosis: Hypothyroidism  Assessment and Plan of Treatment: Continue with synthroid. Follow up with your primary care doctor

## 2020-11-30 NOTE — DISCHARGE NOTE NURSING/CASE MANAGEMENT/SOCIAL WORK - PATIENT PORTAL LINK FT
You can access the FollowMyHealth Patient Portal offered by [facility] by registering at the following website: [email or web address]. By joining [email or web address]’s FollowMyHealth portal, you will also be able to view your health information using other applications (apps) compatible with our system.

## 2020-11-30 NOTE — DISCHARGE NOTE PROVIDER - NSDCMRMEDTOKEN_GEN_ALL_CORE_FT
levothyroxine 200 mcg (0.2 mg) oral capsule: 1 cap(s) orally once a day  Lipitor 20 mg oral tablet: 1 tab(s) orally once a day   levothyroxine 200 mcg (0.2 mg) oral capsule: 1 cap(s) orally once a day   aspirin 81 mg oral tablet, chewable: 1 tab(s) orally once a day  atorvastatin 80 mg oral tablet: 1 tab(s) orally once a day (at bedtime)  clopidogrel 75 mg oral tablet: 1 tab(s) orally once a day  levothyroxine 200 mcg (0.2 mg) oral capsule: 1 cap(s) orally once a day  lisinopril 10 mg oral tablet: 1 tab(s) orally once a day  pantoprazole 40 mg oral delayed release tablet: 1 tab(s) orally once a day

## 2020-11-30 NOTE — PROGRESS NOTE ADULT - SUBJECTIVE AND OBJECTIVE BOX
Cardiology Follow up    NAIF DUONG   55y Female  PAST MEDICAL & SURGICAL HISTORY:  Hypothyroid    GERD (gastroesophageal reflux disease)    Anemia         HPI:  55 female with hypothyroidism presented for chest pain since 9 pm associated with 2 episodes of non-bloody, non-bilious vomiting. She initially presented tot [facility]. Her ECG at the [facility] did not meet criteria for STEMI, but had evidence of hyperacute T-waves, and she was transferred to [place] [facility]. The patient had persistent severe chest pain and the decision was made to proceed with urgent cath/PCI. The patient is S/p cath, currently hemodynamically stable. She had an episode of nausea and vomiting with mild red tinged vomitus. She denies any chest pain, but does say she has really bad gastritis and she has been taking Pepto-Bismol and omeprazole outpatient.    (2020 12:37)    Allergies    No Known Allergies    Intolerances      Patient without complaints. Pt ambulated without issues/symptoms  Denies CP, SOB, palpitations, or dizziness  No events on telemetry overnight    Vital Signs Last 24 Hrs  T(C): 36.5 (2020 04:00), Max: 36.6 (2020 20:00)  T(F): 97.7 (2020 04:00), Max: 97.9 (2020 20:00)  HR: 56 (2020 08:00) (51 - 64)  BP: 145/65 (2020 08:00) (106/49 - 155/73)  BP(mean): 91 (2020 08:00) (70 - 107)  RR: 20 (2020 08:00) (17 - 34)  SpO2: 97% (2020 06:00) (95% - 99%)    MEDICATIONS  (STANDING):  aspirin  chewable 81 milliGRAM(s) Oral daily  atorvastatin 80 milliGRAM(s) Oral at bedtime  chlorhexidine 4% Liquid 1 Application(s) Topical daily  clopidogrel Tablet 75 milliGRAM(s) Oral daily  levothyroxine 200 MICROGram(s) Oral daily  lisinopril 10 milliGRAM(s) Oral daily  nicotine -   7 mG/24Hr(s) Patch 1 patch Transdermal daily  pantoprazole    Tablet 40 milliGRAM(s) Oral two times a day  sodium chloride 0.9%. 1000 milliLiter(s) (75 mL/Hr) IV Continuous <Continuous>    MEDICATIONS  (PRN):  morphine  - Injectable 1 milliGRAM(s) IV Push three times a day PRN Moderate Pain (4 - 6)  ondansetron Injectable 4 milliGRAM(s) IV Push three times a day PRN Nausea and/or Vomiting      REVIEW OF SYSTEMS:          All negative except as mentioned in HPI    PHYSICAL EXAM:           CONSTITUTIONAL: Well-developed; well-nourished; in no acute distress  	SKIN: warm, dry  	HEAD: Normocephalic; atraumatic  	EYES: PERRL.  	ENT: No nasal discharge, airway clear, mucous membranes moist  	NECK: Supple; non tender.  	CARD: +S1, +S2, no murmurs, gallops, or rubs. Regular rate and rhythm    	RESP: No wheezes, rales or rhonchi. CTA B/L  	ABD: soft ntnd, + BS x 4 quadrants  	EXT: moves all extremities,  no clubbing, cyanosis or edema  	NEURO: Alert and oriented x3, no focal deficits          PSYCH: Cooperative, appropriate          VASCULAR:  +2 Rad / +2 PTs / + 2 DPs          EXTREMITY:               	   Right Radial: Dressing D/C/I, pressure dressing removed, access site soft, no hematoma, no pain, + pulses, no sign of infection, no numbness            EC Lead ECG (20 @ 05:43)   Ventricular Rate 55 BPM  Diagnosis Line Sinus bradycardia  Inferior infarct , age undetermined  Marked T wave abnormality, consider inferior ischemia  Abnormal ECG                                                                                                  2D ECHO:    TTE Echo Complete w/o Contrast w/ Doppler (20 @ 07:34)   Summary:   1. Mid and apical inferior wall and mid inferolateral segment are abnormal as described above.   2. LV Ejection Fraction by Fitzpatrick's Method with a biplane EF of 58 %.   3. Normal left atrial size.   4. Normal right atrial size.   5. Mild mitral valve regurgitation.   6. Mild tricuspid regurgitation.    PHYSICIAN INTERPRETATION:  Left Ventricle: Normal left ventricular size and wall thicknesses, with normal systolic and diastolic function.      LV Wall Scoring:  The mid and apical inferior wall and mid inferolateral segment are  hypokinetic. All remaining scored segments are normal.    Right Ventricle: Normal right ventricular size and function.  Left Atrium: Normal left atrial size.  Right Atrium: Normal right atrial size.  Pericardium: There is no evidence of pericardial effusion.  Mitral Valve: Structurally normal mitral valve, with normal leaflet excursion. Mild mitral valve regurgitation is seen.  Tricuspid Valve: Structurally normal tricuspid valve, with normal leaflet excursion. Mild tricuspid regurgitation is visualized.  Aortic Valve: Normal trileaflet aortic valve with normal opening. No evidence of aortic valve regurgitation is seen.  Pulmonic Valve: Structurally normal pulmonic valve, with normal leaflet excursion. No indication of pulmonic valve regurgitation.  Aorta: The aortic root and ascending aorta are structurally normal, with no evidence of dilitation.  Pulmonary Artery: The main pulmonary artery is normal in size.                LABS:                        13.5   8.69  )-----------( 201      ( 2020 04:47 )             40.5         139  |  108  |  18  ----------------------------<  114<H>  3.7   |  22  |  0.7    Ca    8.8      2020 04:47  Mg     2.1         TPro  5.3<L>  /  Alb  3.5  /  TBili  0.3  /  DBili  x   /  AST  34  /  ALT  23  /  AlkPhos  84      CARDIAC MARKERS ( 2020 04:47 )  x     / 1.35 ng/mL / x     / x     / x      CARDIAC MARKERS ( 2020 21:38 )  x     / 1.12 ng/mL / x     / x     / x      CARDIAC MARKERS ( 2020 16:29 )  x     / 1.11 ng/mL / x     / x     / x      CARDIAC MARKERS ( 2020 12:06 )  x     / 1.19 ng/mL / x     / x     / x      CARDIAC MARKERS ( 2020 05:16 )  x     / 1.25 ng/mL / 683 U/L / x     / 50.0 ng/mL  CARDIAC MARKERS ( 2020 00:32 )  x     / 1.44 ng/mL / x     / x     / x      CARDIAC MARKERS ( 2020 16:51 )  x     / 2.53 ng/mL / 1388 U/L / x     / 217.7 ng/mL      Magnesium, Serum: 2.1 mg/dL [1.8 - 2.4] (20 @ 04:47)  LIVER FUNCTIONS - ( 2020 04:47 )  Alb: 3.5 g/dL / Pro: 5.3 g/dL / ALK PHOS: 84 U/L / ALT: 23 U/L / AST: 34 U/L / GGT: x             A/P:  I discussed the case with Cardiologist Dr. Washington  and recommend the following:    S/P PCI:    INTERVENTION  IMPLANTS:  2.5 X 20 Synergy RX drug-eluting distal LCx  POST-OP DIAGNOSIS  Significant single vessel disease (LCx)             	         Continue DAPT ( Aspirin 81 mg PO Daily and   Plavix 75 mg po daily   ),ACE, Statin Therapy                   No BB due to bradycardia.                    Patient given 30 day supply of ( Aspirin 81 mg daily and Plavix 75 mg daily ) to take at home                   Continue Protonix po                    Monitor access site                   Patient agreeing to take DAPT for at least one year or as directed by cardiologist                    Pt given instructions on importance of taking antiplatelet medication or risk acute stent thrombosis/death                   Post cath instructions, access site care and activity restrictions reviewed with patient                     Discussed with patient to return to hospital if experience chest pain, shortness breath, dizziness and site bleeding                   Aggressive risk factor modification, diet counseling, smoking cessation discussed with patient                       Follow up with Cardiology Dr. Washington  in one to two weeks.  Instructed to call and make an appointment                    Can discharge patient from cardiac standpoint       Cardiology Follow up    NAIF DUONG   55y Female  PAST MEDICAL & SURGICAL HISTORY:  Hypothyroid    GERD (gastroesophageal reflux disease)    Anemia         HPI:  55 female with hypothyroidism presented for chest pain since 9 pm associated with 2 episodes of non-bloody, non-bilious vomiting. She initially presented tot [facility]. Her ECG at the [facility] ER did not meet criteria for STEMI, but had evidence of hyperacute T-waves, and she was transferred to [place] ER. The patient had persistent severe chest pain and the decision was made to proceed with urgent cath/PCI. The patient is S/p cath, currently hemodynamically stable. She had an episode of nausea and vomiting with mild red tinged vomitus. She denies any chest pain, but does say she has really bad gastritis and she has been taking Pepto-Bismol and omeprazole outpatient.    (2020 12:37)    Allergies    No Known Allergies    Intolerances      Patient without complaints. Pt ambulated without issues/symptoms  Denies CP, SOB, palpitations, or dizziness  No events on telemetry overnight    Vital Signs Last 24 Hrs  T(C): 36.5 (2020 04:00), Max: 36.6 (2020 20:00)  T(F): 97.7 (2020 04:00), Max: 97.9 (2020 20:00)  HR: 56 (2020 08:00) (51 - 64)  BP: 145/65 (2020 08:00) (106/49 - 155/73)  BP(mean): 91 (2020 08:00) (70 - 107)  RR: 20 (2020 08:00) (17 - 34)  SpO2: 97% (2020 06:00) (95% - 99%)    MEDICATIONS  (STANDING):  aspirin  chewable 81 milliGRAM(s) Oral daily  atorvastatin 80 milliGRAM(s) Oral at bedtime  chlorhexidine 4% Liquid 1 Application(s) Topical daily  clopidogrel Tablet 75 milliGRAM(s) Oral daily  levothyroxine 200 MICROGram(s) Oral daily  lisinopril 10 milliGRAM(s) Oral daily  nicotine -   7 mG/24Hr(s) Patch 1 patch Transdermal daily  pantoprazole    Tablet 40 milliGRAM(s) Oral two times a day  sodium chloride 0.9%. 1000 milliLiter(s) (75 mL/Hr) IV Continuous <Continuous>    MEDICATIONS  (PRN):  morphine  - Injectable 1 milliGRAM(s) IV Push three times a day PRN Moderate Pain (4 - 6)  ondansetron Injectable 4 milliGRAM(s) IV Push three times a day PRN Nausea and/or Vomiting      REVIEW OF SYSTEMS:          All negative except as mentioned in HPI    PHYSICAL EXAM:           CONSTITUTIONAL: Well-developed; well-nourished; in no acute distress  	SKIN: warm, dry  	HEAD: Normocephalic; atraumatic  	EYES: PERRL.  	ENT: No nasal discharge, airway clear, mucous membranes moist  	NECK: Supple; non tender.  	CARD: +S1, +S2, no murmurs, gallops, or rubs. Regular rate and rhythm    	RESP: No wheezes, rales or rhonchi. CTA B/L  	ABD: soft ntnd, + BS x 4 quadrants  	EXT: moves all extremities,  no clubbing, cyanosis or edema  	NEURO: Alert and oriented x3, no focal deficits          PSYCH: Cooperative, appropriate          VASCULAR:  +2 Rad / +2 PTs / + 2 DPs          EXTREMITY:               	   Right Radial: Dressing D/C/I, pressure dressing removed, access site soft, no hematoma, no pain, + pulses, no sign of infection, no numbness            EC Lead ECG (20 @ 05:43)   Ventricular Rate 55 BPM  Diagnosis Line Sinus bradycardia  Inferior infarct , age undetermined  Marked T wave abnormality, consider inferior ischemia  Abnormal ECG                                                                                                  2D ECHO:    TTE Echo Complete w/o Contrast w/ Doppler (20 @ 07:34)   Summary:   1. Mid and apical inferior wall and mid inferolateral segment are abnormal as described above.   2. LV Ejection Fraction by Fitzpatrick's Method with a biplane EF of 58 %.   3. Normal left atrial size.   4. Normal right atrial size.   5. Mild mitral valve regurgitation.   6. Mild tricuspid regurgitation.    PHYSICIAN INTERPRETATION:  Left Ventricle: Normal left ventricular size and wall thicknesses, with normal systolic and diastolic function.      LV Wall Scoring:  The mid and apical inferior wall and mid inferolateral segment are  hypokinetic. All remaining scored segments are normal.    Right Ventricle: Normal right ventricular size and function.  Left Atrium: Normal left atrial size.  Right Atrium: Normal right atrial size.  Pericardium: There is no evidence of pericardial effusion.  Mitral Valve: Structurally normal mitral valve, with normal leaflet excursion. Mild mitral valve regurgitation is seen.  Tricuspid Valve: Structurally normal tricuspid valve, with normal leaflet excursion. Mild tricuspid regurgitation is visualized.  Aortic Valve: Normal trileaflet aortic valve with normal opening. No evidence of aortic valve regurgitation is seen.  Pulmonic Valve: Structurally normal pulmonic valve, with normal leaflet excursion. No indication of pulmonic valve regurgitation.  Aorta: The aortic root and ascending aorta are structurally normal, with no evidence of dilitation.  Pulmonary Artery: The main pulmonary artery is normal in size.                LABS:                        13.5   8.69  )-----------( 201      ( 2020 04:47 )             40.5         139  |  108  |  18  ----------------------------<  114<H>  3.7   |  22  |  0.7    Ca    8.8      2020 04:47  Mg     2.1         TPro  5.3<L>  /  Alb  3.5  /  TBili  0.3  /  DBili  x   /  AST  34  /  ALT  23  /  AlkPhos  84      CARDIAC MARKERS ( 2020 04:47 )  x     / 1.35 ng/mL / x     / x     / x      CARDIAC MARKERS ( 2020 21:38 )  x     / 1.12 ng/mL / x     / x     / x      CARDIAC MARKERS ( 2020 16:29 )  x     / 1.11 ng/mL / x     / x     / x      CARDIAC MARKERS ( 2020 12:06 )  x     / 1.19 ng/mL / x     / x     / x      CARDIAC MARKERS ( 2020 05:16 )  x     / 1.25 ng/mL / 683 U/L / x     / 50.0 ng/mL  CARDIAC MARKERS ( 2020 00:32 )  x     / 1.44 ng/mL / x     / x     / x      CARDIAC MARKERS ( 2020 16:51 )  x     / 2.53 ng/mL / 1388 U/L / x     / 217.7 ng/mL      Magnesium, Serum: 2.1 mg/dL [1.8 - 2.4] (20 @ 04:47)  LIVER FUNCTIONS - ( 2020 04:47 )  Alb: 3.5 g/dL / Pro: 5.3 g/dL / ALK PHOS: 84 U/L / ALT: 23 U/L / AST: 34 U/L / GGT: x             A/P:  I discussed the case with Cardiologist Dr. Washington  and recommend the following:    S/P PCI:    INTERVENTION  IMPLANTS:  2.5 X 20 Synergy RX drug-eluting distal LCx  POST-OP DIAGNOSIS  Significant single vessel disease (LCx)             	         Continue DAPT ( Aspirin 81 mg PO Daily and   Plavix 75 mg po daily   ),ACE, Statin Therapy                   No BB due to bradycardia.                    Patient given 30 day supply of ( Aspirin 81 mg daily and Plavix 75 mg daily ) to take at home                   Continue Protonix po                    Monitor access site                   Patient agreeing to take DAPT for at least one year or as directed by cardiologist                    Pt given instructions on importance of taking antiplatelet medication or risk acute stent thrombosis/death                   Post cath instructions, access site care and activity restrictions reviewed with patient                     Discussed with patient to return to hospital if experience chest pain, shortness breath, dizziness and site bleeding                   Aggressive risk factor modification, diet counseling, smoking cessation discussed with patient                       Follow up with Cardiology Dr. Washington  in one to two weeks.  Instructed to call and make an appointment                    Can discharge patient from cardiac standpoint        Patient seen and examined. Discussed with the NP.  D/c home today.  medications reviewed  D/c directly form the CCU  38 minutes in d/c planning, patient care

## 2020-11-30 NOTE — DISCHARGE NOTE NURSING/CASE MANAGEMENT/SOCIAL WORK - NSDCPEWEB_GEN_ALL_CORE
[facility] for Tobacco Control website --- [email or web address] website --- [email or web address]

## 2020-11-30 NOTE — DISCHARGE NOTE PROVIDER - NSDCQMSTATINB_CARD_A_CORE
Other (Free Text): Ln applied at no charge to the patient. Note Text (......Xxx Chief Complaint.): This diagnosis correlates with the Detail Level: Zone Yes

## 2020-12-02 PROBLEM — K21.9 GASTRO-ESOPHAGEAL REFLUX DISEASE WITHOUT ESOPHAGITIS: Chronic | Status: ACTIVE | Noted: 2020-11-28

## 2020-12-02 PROBLEM — D64.9 ANEMIA, UNSPECIFIED: Chronic | Status: ACTIVE | Noted: 2020-11-28

## 2020-12-02 PROBLEM — E03.9 HYPOTHYROIDISM, UNSPECIFIED: Chronic | Status: ACTIVE | Noted: 2020-11-28

## 2020-12-04 DIAGNOSIS — I21.3 ST ELEVATION (STEMI) MYOCARDIAL INFARCTION OF UNSPECIFIED SITE: ICD-10-CM

## 2020-12-04 DIAGNOSIS — I10 ESSENTIAL (PRIMARY) HYPERTENSION: ICD-10-CM

## 2020-12-04 DIAGNOSIS — K92.0 HEMATEMESIS: ICD-10-CM

## 2020-12-04 DIAGNOSIS — K21.9 GASTRO-ESOPHAGEAL REFLUX DISEASE WITHOUT ESOPHAGITIS: ICD-10-CM

## 2020-12-04 DIAGNOSIS — E03.9 HYPOTHYROIDISM, UNSPECIFIED: ICD-10-CM

## 2020-12-04 DIAGNOSIS — R00.1 BRADYCARDIA, UNSPECIFIED: ICD-10-CM

## 2020-12-04 DIAGNOSIS — I25.10 ATHEROSCLEROTIC HEART DISEASE OF NATIVE CORONARY ARTERY WITHOUT ANGINA PECTORIS: ICD-10-CM

## 2020-12-04 DIAGNOSIS — E78.5 HYPERLIPIDEMIA, UNSPECIFIED: ICD-10-CM

## 2020-12-10 PROBLEM — Z00.00 ENCOUNTER FOR PREVENTIVE HEALTH EXAMINATION: Status: ACTIVE | Noted: 2020-12-10

## 2020-12-14 ENCOUNTER — APPOINTMENT (OUTPATIENT)
Dept: CARDIOLOGY | Facility: CLINIC | Age: 55
End: 2020-12-14
Payer: MEDICAID

## 2020-12-14 ENCOUNTER — RESULT CHARGE (OUTPATIENT)
Age: 55
End: 2020-12-14

## 2020-12-14 VITALS
DIASTOLIC BLOOD PRESSURE: 70 MMHG | BODY MASS INDEX: 18.03 KG/M2 | TEMPERATURE: 97.7 F | HEIGHT: 62 IN | SYSTOLIC BLOOD PRESSURE: 104 MMHG | HEART RATE: 54 BPM | WEIGHT: 98 LBS

## 2020-12-14 DIAGNOSIS — Z98.61 ATHEROSCLEROTIC HEART DISEASE OF NATIVE CORONARY ARTERY W/OUT ANGINA PECTORIS: ICD-10-CM

## 2020-12-14 DIAGNOSIS — E78.5 HYPERLIPIDEMIA, UNSPECIFIED: ICD-10-CM

## 2020-12-14 DIAGNOSIS — I25.10 ATHEROSCLEROTIC HEART DISEASE OF NATIVE CORONARY ARTERY W/OUT ANGINA PECTORIS: ICD-10-CM

## 2020-12-14 DIAGNOSIS — Z87.891 PERSONAL HISTORY OF NICOTINE DEPENDENCE: ICD-10-CM

## 2020-12-14 DIAGNOSIS — I10 ESSENTIAL (PRIMARY) HYPERTENSION: ICD-10-CM

## 2020-12-14 PROCEDURE — 99214 OFFICE O/P EST MOD 30 MIN: CPT

## 2020-12-14 PROCEDURE — 93000 ELECTROCARDIOGRAM COMPLETE: CPT

## 2020-12-14 PROCEDURE — 99072 ADDL SUPL MATRL&STAF TM PHE: CPT

## 2020-12-14 RX ORDER — LEVOTHYROXINE SODIUM 0.2 MG/1
200 TABLET ORAL DAILY
Refills: 0 | Status: ACTIVE | COMMUNITY

## 2020-12-14 RX ORDER — LISINOPRIL 10 MG/1
10 TABLET ORAL DAILY
Qty: 90 | Refills: 3 | Status: ACTIVE | COMMUNITY
Start: 1900-01-01 | End: 1900-01-01

## 2020-12-14 RX ORDER — PANTOPRAZOLE 40 MG/1
40 TABLET, DELAYED RELEASE ORAL DAILY
Refills: 0 | Status: ACTIVE | COMMUNITY

## 2020-12-14 RX ORDER — PHENYLEPHRINE HCL 10 MG
7 TABLET ORAL DAILY
Qty: 30 | Refills: 3 | Status: ACTIVE | COMMUNITY
Start: 2020-11-30 | End: 1900-01-01

## 2020-12-14 RX ORDER — ADHESIVE TAPE 3"X 2.3 YD
50 MCG TAPE, NON-MEDICATED TOPICAL
Qty: 30 | Refills: 0 | Status: ACTIVE | COMMUNITY
Start: 2020-09-24

## 2020-12-14 RX ORDER — CLOPIDOGREL BISULFATE 75 MG/1
75 TABLET, FILM COATED ORAL DAILY
Qty: 90 | Refills: 3 | Status: ACTIVE | COMMUNITY
Start: 1900-01-01 | End: 1900-01-01

## 2020-12-14 RX ORDER — ATORVASTATIN CALCIUM 40 MG/1
40 TABLET, FILM COATED ORAL DAILY
Qty: 90 | Refills: 3 | Status: ACTIVE | COMMUNITY
Start: 1900-01-01 | End: 1900-01-01

## 2020-12-14 NOTE — HISTORY OF PRESENT ILLNESS
[FreeTextEntry1] : 56 y/o female with PMHx of hypothyroidism and gastritis, s/p recent admission to [facility] with NSTEMI, s/p PCI of distal LCX for occluded vessel. No further chest pain, no dyspnea. The patient was able to quit smoking since the discharge. Using patch for replacement. Complaint with the medications.

## 2020-12-14 NOTE — PHYSICAL EXAM
[General Appearance - Well Developed] : well developed [Normal Appearance] : normal appearance [Well Groomed] : well groomed [General Appearance - Well Nourished] : well nourished [No Deformities] : no deformities [General Appearance - In No Acute Distress] : no acute distress [Normal Conjunctiva] : the conjunctiva exhibited no abnormalities [Eyelids - No Xanthelasma] : the eyelids demonstrated no xanthelasmas [FreeTextEntry1] : no JVD [] : no respiratory distress [Respiration, Rhythm And Depth] : normal respiratory rhythm and effort [Exaggerated Use Of Accessory Muscles For Inspiration] : no accessory muscle use [Auscultation Breath Sounds / Voice Sounds] : lungs were clear to auscultation bilaterally [Heart Rate And Rhythm] : heart rate and rhythm were normal [Heart Sounds] : normal S1 and S2 [Murmurs] : no murmurs present [Edema] : no peripheral edema present [Bowel Sounds] : normal bowel sounds [Abdomen Soft] : soft [Abdomen Tenderness] : non-tender [Abnormal Walk] : normal gait [Nail Clubbing] : no clubbing of the fingernails [Cyanosis, Localized] : no localized cyanosis [Skin Color & Pigmentation] : normal skin color and pigmentation [Skin Turgor] : normal skin turgor [No Venous Stasis] : no venous stasis [Oriented To Time, Place, And Person] : oriented to person, place, and time [Affect] : the affect was normal

## 2020-12-14 NOTE — ASSESSMENT
[FreeTextEntry1] : CAD, s/p NSTEMI\par S/p PCI of LCX\par Preserved LV function.\par Hospital records reviewed\par Echo reviewed\par Cath reviewed\par ECG reviewed\par Labs reviewed\par \par C/w medical therapy\par Decrease Lipitor to 40 mg\par Repeat labs in 3 months\par Nicotine patch - avoidance of tobacco encouraged again.\par C/w BP control\par Secondary prevention.\par F/u in 3 months.

## 2021-03-15 ENCOUNTER — APPOINTMENT (OUTPATIENT)
Dept: CARDIOLOGY | Facility: CLINIC | Age: 56
End: 2021-03-15

## 2024-08-20 NOTE — CONSULT NOTE ADULT - ASSESSMENT
0 IMPRESSION:     ACS- unremitting chest pain   DL     Emergent LHC   Echo, f/u COVID   check TSH, FT4   Further recs following LHC IMPRESSION:     ACS - unremitting chest pain   DL     Emergent LHC   Echo, f/u COVID   check TSH, FT4   Further recs following LHC